# Patient Record
Sex: FEMALE | Race: WHITE | NOT HISPANIC OR LATINO | URBAN - METROPOLITAN AREA
[De-identification: names, ages, dates, MRNs, and addresses within clinical notes are randomized per-mention and may not be internally consistent; named-entity substitution may affect disease eponyms.]

---

## 2020-08-17 ENCOUNTER — EMERGENCY (EMERGENCY)
Facility: HOSPITAL | Age: 20
LOS: 1 days | End: 2020-08-17
Attending: EMERGENCY MEDICINE | Admitting: EMERGENCY MEDICINE
Payer: COMMERCIAL

## 2020-08-17 VITALS
OXYGEN SATURATION: 98 % | RESPIRATION RATE: 20 BRPM | HEIGHT: 67 IN | SYSTOLIC BLOOD PRESSURE: 138 MMHG | WEIGHT: 130.07 LBS | DIASTOLIC BLOOD PRESSURE: 79 MMHG | HEART RATE: 120 BPM | TEMPERATURE: 98 F

## 2020-08-17 LAB
ALBUMIN SERPL ELPH-MCNC: 5 G/DL — SIGNIFICANT CHANGE UP (ref 3.3–5)
ALP SERPL-CCNC: 62 U/L — SIGNIFICANT CHANGE UP (ref 30–120)
ALT FLD-CCNC: 16 U/L DA — SIGNIFICANT CHANGE UP (ref 10–60)
ANION GAP SERPL CALC-SCNC: 12 MMOL/L — SIGNIFICANT CHANGE UP (ref 5–17)
APAP SERPL-MCNC: <1 UG/ML — LOW (ref 10–30)
APPEARANCE UR: CLEAR — SIGNIFICANT CHANGE UP
AST SERPL-CCNC: 22 U/L — SIGNIFICANT CHANGE UP (ref 10–40)
BASOPHILS # BLD AUTO: 0.06 K/UL — SIGNIFICANT CHANGE UP (ref 0–0.2)
BASOPHILS NFR BLD AUTO: 0.7 % — SIGNIFICANT CHANGE UP (ref 0–2)
BILIRUB SERPL-MCNC: 2.2 MG/DL — HIGH (ref 0.2–1.2)
BILIRUB UR-MCNC: NEGATIVE — SIGNIFICANT CHANGE UP
BUN SERPL-MCNC: 12 MG/DL — SIGNIFICANT CHANGE UP (ref 7–23)
CALCIUM SERPL-MCNC: 9.4 MG/DL — SIGNIFICANT CHANGE UP (ref 8.4–10.5)
CHLORIDE SERPL-SCNC: 104 MMOL/L — SIGNIFICANT CHANGE UP (ref 96–108)
CO2 SERPL-SCNC: 26 MMOL/L — SIGNIFICANT CHANGE UP (ref 22–31)
COLOR SPEC: YELLOW — SIGNIFICANT CHANGE UP
CREAT SERPL-MCNC: 0.98 MG/DL — SIGNIFICANT CHANGE UP (ref 0.5–1.3)
DIFF PNL FLD: NEGATIVE — SIGNIFICANT CHANGE UP
EOSINOPHIL # BLD AUTO: 0.05 K/UL — SIGNIFICANT CHANGE UP (ref 0–0.5)
EOSINOPHIL NFR BLD AUTO: 0.6 % — SIGNIFICANT CHANGE UP (ref 0–6)
ETHANOL SERPL-MCNC: <3 MG/DL — SIGNIFICANT CHANGE UP (ref 0–3)
GLUCOSE SERPL-MCNC: 115 MG/DL — HIGH (ref 70–99)
GLUCOSE UR QL: NEGATIVE MG/DL — SIGNIFICANT CHANGE UP
HCG UR QL: NEGATIVE — SIGNIFICANT CHANGE UP
HCT VFR BLD CALC: 36.5 % — SIGNIFICANT CHANGE UP (ref 34.5–45)
HGB BLD-MCNC: 12.3 G/DL — SIGNIFICANT CHANGE UP (ref 11.5–15.5)
IMM GRANULOCYTES NFR BLD AUTO: 0.2 % — SIGNIFICANT CHANGE UP (ref 0–1.5)
KETONES UR-MCNC: ABNORMAL
LEUKOCYTE ESTERASE UR-ACNC: NEGATIVE — SIGNIFICANT CHANGE UP
LYMPHOCYTES # BLD AUTO: 1.72 K/UL — SIGNIFICANT CHANGE UP (ref 1–3.3)
LYMPHOCYTES # BLD AUTO: 20.4 % — SIGNIFICANT CHANGE UP (ref 13–44)
MCHC RBC-ENTMCNC: 31.1 PG — SIGNIFICANT CHANGE UP (ref 27–34)
MCHC RBC-ENTMCNC: 33.7 GM/DL — SIGNIFICANT CHANGE UP (ref 32–36)
MCV RBC AUTO: 92.2 FL — SIGNIFICANT CHANGE UP (ref 80–100)
MONOCYTES # BLD AUTO: 0.8 K/UL — SIGNIFICANT CHANGE UP (ref 0–0.9)
MONOCYTES NFR BLD AUTO: 9.5 % — SIGNIFICANT CHANGE UP (ref 2–14)
NEUTROPHILS # BLD AUTO: 5.8 K/UL — SIGNIFICANT CHANGE UP (ref 1.8–7.4)
NEUTROPHILS NFR BLD AUTO: 68.6 % — SIGNIFICANT CHANGE UP (ref 43–77)
NITRITE UR-MCNC: NEGATIVE — SIGNIFICANT CHANGE UP
NRBC # BLD: 0 /100 WBCS — SIGNIFICANT CHANGE UP (ref 0–0)
PCP SPEC-MCNC: SIGNIFICANT CHANGE UP
PH UR: 5 — SIGNIFICANT CHANGE UP (ref 5–8)
PLATELET # BLD AUTO: 290 K/UL — SIGNIFICANT CHANGE UP (ref 150–400)
POTASSIUM SERPL-MCNC: 2.9 MMOL/L — CRITICAL LOW (ref 3.5–5.3)
POTASSIUM SERPL-SCNC: 2.9 MMOL/L — CRITICAL LOW (ref 3.5–5.3)
PROT SERPL-MCNC: 8.6 G/DL — HIGH (ref 6–8.3)
PROT UR-MCNC: NEGATIVE MG/DL — SIGNIFICANT CHANGE UP
RBC # BLD: 3.96 M/UL — SIGNIFICANT CHANGE UP (ref 3.8–5.2)
RBC # FLD: 12.9 % — SIGNIFICANT CHANGE UP (ref 10.3–14.5)
SALICYLATES SERPL-MCNC: 0.8 MG/DL — LOW (ref 2.8–20)
SODIUM SERPL-SCNC: 142 MMOL/L — SIGNIFICANT CHANGE UP (ref 135–145)
SP GR SPEC: 1 — LOW (ref 1.01–1.02)
UROBILINOGEN FLD QL: NEGATIVE MG/DL — SIGNIFICANT CHANGE UP
WBC # BLD: 8.45 K/UL — SIGNIFICANT CHANGE UP (ref 3.8–10.5)
WBC # FLD AUTO: 8.45 K/UL — SIGNIFICANT CHANGE UP (ref 3.8–10.5)

## 2020-08-17 PROCEDURE — 93010 ELECTROCARDIOGRAM REPORT: CPT

## 2020-08-17 PROCEDURE — 99291 CRITICAL CARE FIRST HOUR: CPT

## 2020-08-17 RX ORDER — POTASSIUM CHLORIDE 20 MEQ
10 PACKET (EA) ORAL ONCE
Refills: 0 | Status: COMPLETED | OUTPATIENT
Start: 2020-08-17 | End: 2020-08-17

## 2020-08-17 RX ORDER — SODIUM CHLORIDE 9 MG/ML
1000 INJECTION INTRAMUSCULAR; INTRAVENOUS; SUBCUTANEOUS ONCE
Refills: 0 | Status: COMPLETED | OUTPATIENT
Start: 2020-08-17 | End: 2020-08-17

## 2020-08-17 RX ORDER — ONDANSETRON 8 MG/1
4 TABLET, FILM COATED ORAL ONCE
Refills: 0 | Status: COMPLETED | OUTPATIENT
Start: 2020-08-17 | End: 2020-08-17

## 2020-08-17 RX ADMIN — SODIUM CHLORIDE 1000 MILLILITER(S): 9 INJECTION INTRAMUSCULAR; INTRAVENOUS; SUBCUTANEOUS at 23:04

## 2020-08-17 RX ADMIN — SODIUM CHLORIDE 1000 MILLILITER(S): 9 INJECTION INTRAMUSCULAR; INTRAVENOUS; SUBCUTANEOUS at 23:38

## 2020-08-17 RX ADMIN — ONDANSETRON 4 MILLIGRAM(S): 8 TABLET, FILM COATED ORAL at 23:04

## 2020-08-17 RX ADMIN — Medication 100 MILLIEQUIVALENT(S): at 23:09

## 2020-08-17 RX ADMIN — SODIUM CHLORIDE 1000 MILLILITER(S): 9 INJECTION INTRAMUSCULAR; INTRAVENOUS; SUBCUTANEOUS at 21:40

## 2020-08-17 RX ADMIN — ONDANSETRON 4 MILLIGRAM(S): 8 TABLET, FILM COATED ORAL at 22:23

## 2020-08-17 NOTE — ED ADULT NURSE NOTE - NS TRANSFER PATIENT BELONGINGS
Cell Phone/PDA (specify)/Jewelry/Money (specify)/clothing secrued at nurse's desk, valuables given to security/Clothing

## 2020-08-17 NOTE — ED ADULT NURSE NOTE - HPI (INCLUDE ILLNESS QUALITY, SEVERITY, DURATION, TIMING, CONTEXT, MODIFYING FACTORS, ASSOCIATED SIGNS AND SYMPTOMS)
Patient took overdose of Naproxen 250mg multiple tablets in an attempt to harm herself and then thought about it and told her parents. Patient unsure of stressors but states her laptop broke yesterday and her father earlier today was telling her what it would require in order to be fixed.

## 2020-08-17 NOTE — ED PROVIDER NOTE - CLINICAL SUMMARY MEDICAL DECISION MAKING FREE TEXT BOX
19 y.o. F BIB parents for suicidal overdose naproxen about 1.5 hr ago - had one episode of vomiting - in ED appears well, still nauseated - will check labs for psych clearance, provide antiemetic, hydrate, reassess

## 2020-08-17 NOTE — ED ADULT NURSE NOTE - OBJECTIVE STATEMENT
Patient states about 1 1/2 hours ago she took "handfulls" of Naproxen in an attempt to harm herself Patient states about 1 1/2 hours ago she took "handfulls" of Naproxen in an attempt to harm herself. Patient states it was a bottle of about 100 tabs, 250mg each and thinks she may have taken about 70 tabs. Patient states she told a friend and then decided to tell her parents what she had done. Patient has history of prior overdose of Advil 3 years ago and states she was seen in an ED, spoke with psych and was cleared to go home. Patient has never been admitted to psych. Patient also states she does cutting at times, few linear scars noted to her left forearm/wrist. Patient states about 1 1/2 hours ago she took "handfulls" of Naproxen in an attempt to harm herself. Patient states it was a bottle of about 100 tabs, 250mg each and thinks she may have taken about 70 tabs. Patient states she told a friend and then decided to tell her parents what she had done. Patient self induced vomiting once prior to arrival and continues to feel nausea and dizziness. Patient is awake alert and cooperative. Patient has history of prior overdose of Advil 3 years ago and states she was seen in an ED, spoke with psych and was cleared to go home. Patient has never been admitted to psych. Patient also states she does cutting at times, few linear scars noted to her left forearm/wrist.

## 2020-08-17 NOTE — ED PROVIDER NOTE - OBJECTIVE STATEMENT
19 y.o. F c/o suicidal ingestion, states she finished a bottle of naproxen 250mg tabs - states was a 100tab bottle, was not full, just took them by the handful until it was empty - states she vomited a lot in the car on the way here, still feeling some nausea and lightheadedness, no other complaints.     States she was upset b/c her laptop was broken and her father was telling her about how expensive it would be to repair and her mother and brother had said something else that upset her,    tried to overdose once before - on motrin when she was 16, states she was seen at a hospital, had psych eval and was released.  has some cutting behavior as well 19 y.o. F c/o suicidal ingestion, states she finished a bottle of naproxen 250mg tabs - states was a 100tab bottle, was not full, just took them by the handful until it was empty - states she vomited a lot in the car on the way here, still feeling some nausea and lightheadedness, no other complaints. No report of coingestants     States she was upset b/c her laptop was broken and her father was telling her about how expensive it would be to repair and her mother and brother had said something else that upset her,    tried to overdose once before - on motrin when she was 16, states she was seen at a hospital, had psych eval and was released.  has some cutting behavior as well previously - states she was on meds but tried a lot of different ones and none helped, also did not get along with therapist, so has no current treatment for depression electronic

## 2020-08-17 NOTE — ED ADULT NURSE NOTE - ED STAT RN HANDOFF DETAILS
Constant observation maintained. IV patent and infusing. Patient received Zofran X 2 with relief of vomiting at this time. CM sinus tachycardia. Patient awake, alert and cooperative. Constant observation maintained. IV patent and infusing. Patient received Zofran X 2 with relief of vomiting at this time. CM sinus tachycardia. Patient awake, alert and cooperative. Endorsed to Desmond Little RN

## 2020-08-17 NOTE — ED PROVIDER NOTE - NOTES
discussed case with [facility] toxicology fellow - pt in no distress at this time, reports feeling lightheaded and didn't eat much today - will provide supportive care, monitor for change in status, clear if asymptomatic at 6hr for psych - will rediscuss case if any significant changes discussed case with [facility] toxicology fellow Mihaela - pt in no distress at this time, reports feeling lightheaded and didn't eat much today - will provide supportive care, monitor for change in status, clear if asymptomatic at 6hr for psych - will rediscuss case if any significant changes

## 2020-08-17 NOTE — ED PROVIDER NOTE - PROGRESS NOTE DETAILS
pt vomiting, bilious/whitish, no distinct pills in emesis still n/v now, may require medical admission for supportive care with psych c/s went to check on pt after reglan, nurse states she isn't talking to us now, pt is awake, more sleepy, moving arms/legs a lot, turning head left/right, answered 1 question that is still nauseated - pt was previously cooperative/alert/very responsive - aide on 1:1 thinks this change occurred around the time the reglan was administered - akathisia from reglan vs AMS from nsaid OD - rediscussed with med tox fellow - will recheck labs, getting benedryl, needs medical admission - will transfer to [facility] if accepted seems able to focus a little more after dose of benedryl but still fidgety and still less talkative than earlier pt accepted for transfer to [facility] ED, will be assessed for level of admission on assessment there, MICU attg aware of transfer pt not fidgeting now, when name is called appears more alert, then closes eyes again, appears to understand she is being transferred now to [facility], EMS is here

## 2020-08-18 ENCOUNTER — INPATIENT (INPATIENT)
Facility: HOSPITAL | Age: 20
LOS: 1 days | Discharge: PSYCHIATRIC OR SUBSTANCE USE FACILITY | DRG: 917 | End: 2020-08-20
Attending: STUDENT IN AN ORGANIZED HEALTH CARE EDUCATION/TRAINING PROGRAM | Admitting: STUDENT IN AN ORGANIZED HEALTH CARE EDUCATION/TRAINING PROGRAM
Payer: COMMERCIAL

## 2020-08-18 VITALS
RESPIRATION RATE: 20 BRPM | DIASTOLIC BLOOD PRESSURE: 53 MMHG | TEMPERATURE: 98 F | HEART RATE: 113 BPM | OXYGEN SATURATION: 99 % | SYSTOLIC BLOOD PRESSURE: 105 MMHG

## 2020-08-18 VITALS
HEIGHT: 67 IN | SYSTOLIC BLOOD PRESSURE: 148 MMHG | RESPIRATION RATE: 21 BRPM | DIASTOLIC BLOOD PRESSURE: 74 MMHG | HEART RATE: 113 BPM | OXYGEN SATURATION: 100 %

## 2020-08-18 DIAGNOSIS — T39.392A POISONING BY OTHER NONSTEROIDAL ANTI-INFLAMMATORY DRUGS [NSAID], INTENTIONAL SELF-HARM, INITIAL ENCOUNTER: ICD-10-CM

## 2020-08-18 LAB
ALBUMIN SERPL ELPH-MCNC: 4.4 G/DL — SIGNIFICANT CHANGE UP (ref 3.3–5)
ALBUMIN SERPL ELPH-MCNC: 4.5 G/DL — SIGNIFICANT CHANGE UP (ref 3.3–5)
ALP SERPL-CCNC: 52 U/L — SIGNIFICANT CHANGE UP (ref 40–120)
ALP SERPL-CCNC: 54 U/L — SIGNIFICANT CHANGE UP (ref 30–120)
ALT FLD-CCNC: 12 U/L DA — SIGNIFICANT CHANGE UP (ref 10–60)
ALT FLD-CCNC: 14 U/L — SIGNIFICANT CHANGE UP (ref 10–45)
ANION GAP SERPL CALC-SCNC: 12 MMOL/L — SIGNIFICANT CHANGE UP (ref 5–17)
ANION GAP SERPL CALC-SCNC: 15 MMOL/L — SIGNIFICANT CHANGE UP (ref 5–17)
ANION GAP SERPL CALC-SCNC: 16 MMOL/L — SIGNIFICANT CHANGE UP (ref 5–17)
ANION GAP SERPL CALC-SCNC: 17 MMOL/L — SIGNIFICANT CHANGE UP (ref 5–17)
ANION GAP SERPL CALC-SCNC: 21 MMOL/L — HIGH (ref 5–17)
APAP SERPL-MCNC: <15 UG/ML — SIGNIFICANT CHANGE UP (ref 10–30)
APPEARANCE UR: CLEAR — SIGNIFICANT CHANGE UP
APPEARANCE UR: CLEAR — SIGNIFICANT CHANGE UP
AST SERPL-CCNC: 24 U/L — SIGNIFICANT CHANGE UP (ref 10–40)
AST SERPL-CCNC: 25 U/L — SIGNIFICANT CHANGE UP (ref 10–40)
BACTERIA # UR AUTO: NEGATIVE — SIGNIFICANT CHANGE UP
BASE EXCESS BLDCOV CALC-SCNC: -8.6 MMOL/L — SIGNIFICANT CHANGE UP (ref -9.3–0.3)
BASE EXCESS BLDV CALC-SCNC: -2.5 MMOL/L — LOW (ref -2–2)
BASE EXCESS BLDV CALC-SCNC: -2.8 MMOL/L — LOW (ref -2–2)
BASE EXCESS BLDV CALC-SCNC: -3.5 MMOL/L — LOW (ref -2–2)
BASE EXCESS BLDV CALC-SCNC: -3.9 MMOL/L — LOW (ref -2–2)
BASOPHILS # BLD AUTO: 0 K/UL — SIGNIFICANT CHANGE UP (ref 0–0.2)
BASOPHILS NFR BLD AUTO: 0 % — SIGNIFICANT CHANGE UP (ref 0–2)
BILIRUB SERPL-MCNC: 3.6 MG/DL — HIGH (ref 0.2–1.2)
BILIRUB SERPL-MCNC: <0.1 MG/DL — LOW (ref 0.2–1.2)
BILIRUB UR-MCNC: NEGATIVE — SIGNIFICANT CHANGE UP
BILIRUB UR-MCNC: NEGATIVE — SIGNIFICANT CHANGE UP
BUN SERPL-MCNC: 6 MG/DL — LOW (ref 7–23)
BUN SERPL-MCNC: 6 MG/DL — LOW (ref 7–23)
BUN SERPL-MCNC: 7 MG/DL — SIGNIFICANT CHANGE UP (ref 7–23)
BUN SERPL-MCNC: 7 MG/DL — SIGNIFICANT CHANGE UP (ref 7–23)
BUN SERPL-MCNC: 9 MG/DL — SIGNIFICANT CHANGE UP (ref 7–23)
CA-I SERPL-SCNC: 1.14 MMOL/L — SIGNIFICANT CHANGE UP (ref 1.12–1.3)
CA-I SERPL-SCNC: 1.16 MMOL/L — SIGNIFICANT CHANGE UP (ref 1.12–1.3)
CA-I SERPL-SCNC: 1.17 MMOL/L — SIGNIFICANT CHANGE UP (ref 1.12–1.3)
CA-I SERPL-SCNC: 1.18 MMOL/L — SIGNIFICANT CHANGE UP (ref 1.12–1.3)
CALCIUM SERPL-MCNC: 8.1 MG/DL — LOW (ref 8.4–10.5)
CALCIUM SERPL-MCNC: 8.1 MG/DL — LOW (ref 8.4–10.5)
CALCIUM SERPL-MCNC: 8.5 MG/DL — SIGNIFICANT CHANGE UP (ref 8.4–10.5)
CALCIUM SERPL-MCNC: 8.8 MG/DL — SIGNIFICANT CHANGE UP (ref 8.4–10.5)
CALCIUM SERPL-MCNC: 9 MG/DL — SIGNIFICANT CHANGE UP (ref 8.4–10.5)
CHLORIDE BLDV-SCNC: 109 MMOL/L — HIGH (ref 96–108)
CHLORIDE BLDV-SCNC: 114 MMOL/L — HIGH (ref 96–108)
CHLORIDE SERPL-SCNC: 102 MMOL/L — SIGNIFICANT CHANGE UP (ref 96–108)
CHLORIDE SERPL-SCNC: 103 MMOL/L — SIGNIFICANT CHANGE UP (ref 96–108)
CHLORIDE SERPL-SCNC: 103 MMOL/L — SIGNIFICANT CHANGE UP (ref 96–108)
CHLORIDE SERPL-SCNC: 106 MMOL/L — SIGNIFICANT CHANGE UP (ref 96–108)
CHLORIDE SERPL-SCNC: 107 MMOL/L — SIGNIFICANT CHANGE UP (ref 96–108)
CO2 BLDCOV-SCNC: 26 MMOL/L — SIGNIFICANT CHANGE UP (ref 22–30)
CO2 BLDV-SCNC: 20 MMOL/L — LOW (ref 22–30)
CO2 BLDV-SCNC: 20 MMOL/L — LOW (ref 22–30)
CO2 BLDV-SCNC: 21 MMOL/L — LOW (ref 22–30)
CO2 BLDV-SCNC: 22 MMOL/L — SIGNIFICANT CHANGE UP (ref 22–30)
CO2 SERPL-SCNC: 16 MMOL/L — LOW (ref 22–31)
CO2 SERPL-SCNC: 17 MMOL/L — LOW (ref 22–31)
CO2 SERPL-SCNC: 18 MMOL/L — LOW (ref 22–31)
CO2 SERPL-SCNC: 19 MMOL/L — LOW (ref 22–31)
CO2 SERPL-SCNC: 21 MMOL/L — LOW (ref 22–31)
COLOR SPEC: COLORLESS — SIGNIFICANT CHANGE UP
COLOR SPEC: SIGNIFICANT CHANGE UP
CREAT SERPL-MCNC: 0.6 MG/DL — SIGNIFICANT CHANGE UP (ref 0.5–1.3)
CREAT SERPL-MCNC: 0.62 MG/DL — SIGNIFICANT CHANGE UP (ref 0.5–1.3)
CREAT SERPL-MCNC: 0.68 MG/DL — SIGNIFICANT CHANGE UP (ref 0.5–1.3)
CREAT SERPL-MCNC: 0.7 MG/DL — SIGNIFICANT CHANGE UP (ref 0.5–1.3)
CREAT SERPL-MCNC: 1.04 MG/DL — SIGNIFICANT CHANGE UP (ref 0.5–1.3)
DIFF PNL FLD: ABNORMAL
DIFF PNL FLD: ABNORMAL
EOSINOPHIL # BLD AUTO: 0 K/UL — SIGNIFICANT CHANGE UP (ref 0–0.5)
EOSINOPHIL NFR BLD AUTO: 0 % — SIGNIFICANT CHANGE UP (ref 0–6)
EPI CELLS # UR: 1 /HPF — SIGNIFICANT CHANGE UP
ETHANOL SERPL-MCNC: SIGNIFICANT CHANGE UP MG/DL (ref 0–10)
GAS PNL BLDCOV: 7.39 — SIGNIFICANT CHANGE UP (ref 7.25–7.45)
GAS PNL BLDV: 135 MMOL/L — SIGNIFICANT CHANGE UP (ref 135–145)
GAS PNL BLDV: 136 MMOL/L — SIGNIFICANT CHANGE UP (ref 135–145)
GAS PNL BLDV: 136 MMOL/L — SIGNIFICANT CHANGE UP (ref 135–145)
GAS PNL BLDV: 137 MMOL/L — SIGNIFICANT CHANGE UP (ref 135–145)
GAS PNL BLDV: SIGNIFICANT CHANGE UP
GLUCOSE BLDV-MCNC: 101 MG/DL — HIGH (ref 70–99)
GLUCOSE BLDV-MCNC: 114 MG/DL — HIGH (ref 70–99)
GLUCOSE BLDV-MCNC: 91 MG/DL — SIGNIFICANT CHANGE UP (ref 70–99)
GLUCOSE BLDV-MCNC: 96 MG/DL — SIGNIFICANT CHANGE UP (ref 70–99)
GLUCOSE SERPL-MCNC: 101 MG/DL — HIGH (ref 70–99)
GLUCOSE SERPL-MCNC: 136 MG/DL — HIGH (ref 70–99)
GLUCOSE SERPL-MCNC: 147 MG/DL — HIGH (ref 70–99)
GLUCOSE SERPL-MCNC: 94 MG/DL — SIGNIFICANT CHANGE UP (ref 70–99)
GLUCOSE SERPL-MCNC: 97 MG/DL — SIGNIFICANT CHANGE UP (ref 70–99)
GLUCOSE UR QL: ABNORMAL
GLUCOSE UR QL: NEGATIVE — SIGNIFICANT CHANGE UP
HCG SERPL-ACNC: <2 MIU/ML — SIGNIFICANT CHANGE UP
HCO3 BLDCOV-SCNC: 17 MMOL/L — SIGNIFICANT CHANGE UP (ref 17–25)
HCO3 BLDV-SCNC: 19 MMOL/L — LOW (ref 21–29)
HCO3 BLDV-SCNC: 19 MMOL/L — LOW (ref 21–29)
HCO3 BLDV-SCNC: 20 MMOL/L — LOW (ref 21–29)
HCO3 BLDV-SCNC: 20 MMOL/L — LOW (ref 21–29)
HCT VFR BLD CALC: 34.3 % — LOW (ref 34.5–45)
HCT VFR BLDA CALC: 33 % — LOW (ref 39–50)
HCT VFR BLDA CALC: 34 % — LOW (ref 39–50)
HCT VFR BLDA CALC: 34 % — LOW (ref 39–50)
HCT VFR BLDA CALC: 36 % — LOW (ref 39–50)
HGB BLD CALC-MCNC: 10.6 G/DL — LOW (ref 11.5–15.5)
HGB BLD CALC-MCNC: 10.9 G/DL — LOW (ref 11.5–15.5)
HGB BLD CALC-MCNC: 11.2 G/DL — LOW (ref 11.5–15.5)
HGB BLD CALC-MCNC: 11.7 G/DL — SIGNIFICANT CHANGE UP (ref 11.5–15.5)
HGB BLD-MCNC: 11.4 G/DL — LOW (ref 11.5–15.5)
HOROWITZ INDEX BLDV+IHG-RTO: 21 — SIGNIFICANT CHANGE UP
HYALINE CASTS # UR AUTO: 0 /LPF — SIGNIFICANT CHANGE UP (ref 0–2)
KETONES UR-MCNC: ABNORMAL
KETONES UR-MCNC: ABNORMAL
LACTATE BLDV-MCNC: 0.5 MMOL/L — LOW (ref 0.7–2)
LACTATE BLDV-MCNC: 0.7 MMOL/L — SIGNIFICANT CHANGE UP (ref 0.7–2)
LACTATE BLDV-MCNC: 0.8 MMOL/L — SIGNIFICANT CHANGE UP (ref 0.7–2)
LACTATE BLDV-MCNC: 0.8 MMOL/L — SIGNIFICANT CHANGE UP (ref 0.7–2)
LEUKOCYTE ESTERASE UR-ACNC: NEGATIVE — SIGNIFICANT CHANGE UP
LEUKOCYTE ESTERASE UR-ACNC: NEGATIVE — SIGNIFICANT CHANGE UP
LYMPHOCYTES # BLD AUTO: 0 % — LOW (ref 13–44)
LYMPHOCYTES # BLD AUTO: 0 K/UL — LOW (ref 1–3.3)
MAGNESIUM SERPL-MCNC: 1.6 MG/DL — SIGNIFICANT CHANGE UP (ref 1.6–2.6)
MAGNESIUM SERPL-MCNC: 1.7 MG/DL — SIGNIFICANT CHANGE UP (ref 1.6–2.6)
MAGNESIUM SERPL-MCNC: 2.3 MG/DL — SIGNIFICANT CHANGE UP (ref 1.6–2.6)
MCHC RBC-ENTMCNC: 30.8 PG — SIGNIFICANT CHANGE UP (ref 27–34)
MCHC RBC-ENTMCNC: 33.2 GM/DL — SIGNIFICANT CHANGE UP (ref 32–36)
MCV RBC AUTO: 92.7 FL — SIGNIFICANT CHANGE UP (ref 80–100)
MONOCYTES # BLD AUTO: 0.68 K/UL — SIGNIFICANT CHANGE UP (ref 0–0.9)
MONOCYTES NFR BLD AUTO: 3.5 % — SIGNIFICANT CHANGE UP (ref 2–14)
NEUTROPHILS # BLD AUTO: 18.75 K/UL — HIGH (ref 1.8–7.4)
NEUTROPHILS NFR BLD AUTO: 92.2 % — HIGH (ref 43–77)
NITRITE UR-MCNC: NEGATIVE — SIGNIFICANT CHANGE UP
NITRITE UR-MCNC: NEGATIVE — SIGNIFICANT CHANGE UP
OTHER CELLS CSF MANUAL: 14 ML/DL — LOW (ref 16–22)
OTHER CELLS CSF MANUAL: 15 ML/DL — LOW (ref 16–22)
OTHER CELLS CSF MANUAL: 15 ML/DL — LOW (ref 16–22)
OTHER CELLS CSF MANUAL: 15 ML/DL — LOW (ref 18–22)
PCO2 BLDCOV: 26 MMHG — LOW (ref 27–49)
PCO2 BLDV: 26 MMHG — LOW (ref 35–50)
PCO2 BLDV: 31 MMHG — LOW (ref 35–50)
PH BLDV: 7.41 — SIGNIFICANT CHANGE UP (ref 7.35–7.45)
PH BLDV: 7.43 — SIGNIFICANT CHANGE UP (ref 7.35–7.45)
PH BLDV: 7.43 — SIGNIFICANT CHANGE UP (ref 7.35–7.45)
PH BLDV: 7.46 — HIGH (ref 7.35–7.45)
PH UR: 6 — SIGNIFICANT CHANGE UP (ref 5–8)
PH UR: 6 — SIGNIFICANT CHANGE UP (ref 5–8)
PHOSPHATE SERPL-MCNC: 1.8 MG/DL — LOW (ref 2.5–4.5)
PHOSPHATE SERPL-MCNC: 1.9 MG/DL — LOW (ref 2.5–4.5)
PHOSPHATE SERPL-MCNC: 2 MG/DL — LOW (ref 2.5–4.5)
PLATELET # BLD AUTO: 280 K/UL — SIGNIFICANT CHANGE UP (ref 150–400)
PO2 BLDCOA: 23 MMHG — SIGNIFICANT CHANGE UP (ref 17–41)
PO2 BLDV: 100 MMHG — HIGH (ref 25–45)
PO2 BLDV: 106 MMHG — HIGH (ref 25–45)
PO2 BLDV: 60 MMHG — HIGH (ref 25–45)
PO2 BLDV: 66 MMHG — HIGH (ref 25–45)
POTASSIUM BLDV-SCNC: 2.9 MMOL/L — CRITICAL LOW (ref 3.5–5.3)
POTASSIUM BLDV-SCNC: 3.1 MMOL/L — LOW (ref 3.5–5.3)
POTASSIUM BLDV-SCNC: 3.5 MMOL/L — SIGNIFICANT CHANGE UP (ref 3.5–5.3)
POTASSIUM BLDV-SCNC: 3.9 MMOL/L — SIGNIFICANT CHANGE UP (ref 3.5–5.3)
POTASSIUM SERPL-MCNC: 3 MMOL/L — LOW (ref 3.5–5.3)
POTASSIUM SERPL-MCNC: 3.1 MMOL/L — LOW (ref 3.5–5.3)
POTASSIUM SERPL-MCNC: 3.6 MMOL/L — SIGNIFICANT CHANGE UP (ref 3.5–5.3)
POTASSIUM SERPL-MCNC: 3.9 MMOL/L — SIGNIFICANT CHANGE UP (ref 3.5–5.3)
POTASSIUM SERPL-MCNC: 4.1 MMOL/L — SIGNIFICANT CHANGE UP (ref 3.5–5.3)
POTASSIUM SERPL-SCNC: 3 MMOL/L — LOW (ref 3.5–5.3)
POTASSIUM SERPL-SCNC: 3.1 MMOL/L — LOW (ref 3.5–5.3)
POTASSIUM SERPL-SCNC: 3.6 MMOL/L — SIGNIFICANT CHANGE UP (ref 3.5–5.3)
POTASSIUM SERPL-SCNC: 3.9 MMOL/L — SIGNIFICANT CHANGE UP (ref 3.5–5.3)
POTASSIUM SERPL-SCNC: 4.1 MMOL/L — SIGNIFICANT CHANGE UP (ref 3.5–5.3)
PROT SERPL-MCNC: 7.3 G/DL — SIGNIFICANT CHANGE UP (ref 6–8.3)
PROT SERPL-MCNC: 7.8 G/DL — SIGNIFICANT CHANGE UP (ref 6–8.3)
PROT UR-MCNC: NEGATIVE — SIGNIFICANT CHANGE UP
PROT UR-MCNC: NEGATIVE — SIGNIFICANT CHANGE UP
RBC # BLD: 3.7 M/UL — LOW (ref 3.8–5.2)
RBC # FLD: 13.2 % — SIGNIFICANT CHANGE UP (ref 10.3–14.5)
RBC CASTS # UR COMP ASSIST: 1 /HPF — SIGNIFICANT CHANGE UP (ref 0–4)
SALICYLATES SERPL-MCNC: <2 MG/DL — LOW (ref 15–30)
SAO2 % BLDCOV: 34 % — SIGNIFICANT CHANGE UP (ref 20–75)
SAO2 % BLDV: 90 % — HIGH (ref 67–88)
SAO2 % BLDV: 94 % — HIGH (ref 67–88)
SAO2 % BLDV: 98 % — HIGH (ref 67–88)
SAO2 % BLDV: 98 % — HIGH (ref 67–88)
SARS-COV-2 IGG SERPL QL IA: NEGATIVE — SIGNIFICANT CHANGE UP
SARS-COV-2 IGG SERPL QL IA: NEGATIVE — SIGNIFICANT CHANGE UP
SARS-COV-2 IGM SERPL IA-ACNC: 6.13 AU/ML — SIGNIFICANT CHANGE UP
SARS-COV-2 IGM SERPL IA-ACNC: <0.1 INDEX — SIGNIFICANT CHANGE UP
SARS-COV-2 RNA SPEC QL NAA+PROBE: SIGNIFICANT CHANGE UP
SARS-COV-2 RNA SPEC QL NAA+PROBE: SIGNIFICANT CHANGE UP
SODIUM SERPL-SCNC: 135 MMOL/L — SIGNIFICANT CHANGE UP (ref 135–145)
SODIUM SERPL-SCNC: 138 MMOL/L — SIGNIFICANT CHANGE UP (ref 135–145)
SODIUM SERPL-SCNC: 138 MMOL/L — SIGNIFICANT CHANGE UP (ref 135–145)
SODIUM SERPL-SCNC: 140 MMOL/L — SIGNIFICANT CHANGE UP (ref 135–145)
SODIUM SERPL-SCNC: 142 MMOL/L — SIGNIFICANT CHANGE UP (ref 135–145)
SP GR SPEC: 1.01 — SIGNIFICANT CHANGE UP (ref 1.01–1.02)
SP GR SPEC: 1.02 — SIGNIFICANT CHANGE UP (ref 1.01–1.02)
TSH SERPL-MCNC: 0.82 UIU/ML — SIGNIFICANT CHANGE UP (ref 0.27–4.2)
UROBILINOGEN FLD QL: NEGATIVE — SIGNIFICANT CHANGE UP
UROBILINOGEN FLD QL: NEGATIVE — SIGNIFICANT CHANGE UP
WBC # BLD: 19.43 K/UL — HIGH (ref 3.8–10.5)
WBC # FLD AUTO: 19.43 K/UL — HIGH (ref 3.8–10.5)
WBC UR QL: 2 /HPF — SIGNIFICANT CHANGE UP (ref 0–5)

## 2020-08-18 PROCEDURE — 90792 PSYCH DIAG EVAL W/MED SRVCS: CPT

## 2020-08-18 PROCEDURE — 99291 CRITICAL CARE FIRST HOUR: CPT

## 2020-08-18 PROCEDURE — 70450 CT HEAD/BRAIN W/O DYE: CPT | Mod: 26

## 2020-08-18 PROCEDURE — 93010 ELECTROCARDIOGRAM REPORT: CPT

## 2020-08-18 PROCEDURE — 99291 CRITICAL CARE FIRST HOUR: CPT | Mod: 25

## 2020-08-18 PROCEDURE — 99291 CRITICAL CARE FIRST HOUR: CPT | Mod: CR

## 2020-08-18 RX ORDER — METOCLOPRAMIDE HCL 10 MG
10 TABLET ORAL ONCE
Refills: 0 | Status: COMPLETED | OUTPATIENT
Start: 2020-08-18 | End: 2020-08-18

## 2020-08-18 RX ORDER — MAGNESIUM SULFATE 500 MG/ML
2 VIAL (ML) INJECTION ONCE
Refills: 0 | Status: COMPLETED | OUTPATIENT
Start: 2020-08-18 | End: 2020-08-18

## 2020-08-18 RX ORDER — SODIUM CHLORIDE 9 MG/ML
1000 INJECTION INTRAMUSCULAR; INTRAVENOUS; SUBCUTANEOUS ONCE
Refills: 0 | Status: COMPLETED | OUTPATIENT
Start: 2020-08-18 | End: 2020-08-18

## 2020-08-18 RX ORDER — POTASSIUM CHLORIDE 20 MEQ
40 PACKET (EA) ORAL ONCE
Refills: 0 | Status: COMPLETED | OUTPATIENT
Start: 2020-08-18 | End: 2020-08-18

## 2020-08-18 RX ORDER — POTASSIUM CHLORIDE 20 MEQ
10 PACKET (EA) ORAL
Refills: 0 | Status: COMPLETED | OUTPATIENT
Start: 2020-08-18 | End: 2020-08-18

## 2020-08-18 RX ORDER — DIPHENHYDRAMINE HCL 50 MG
25 CAPSULE ORAL ONCE
Refills: 0 | Status: COMPLETED | OUTPATIENT
Start: 2020-08-18 | End: 2020-08-18

## 2020-08-18 RX ORDER — PIPERACILLIN AND TAZOBACTAM 4; .5 G/20ML; G/20ML
3.38 INJECTION, POWDER, LYOPHILIZED, FOR SOLUTION INTRAVENOUS EVERY 8 HOURS
Refills: 0 | Status: DISCONTINUED | OUTPATIENT
Start: 2020-08-18 | End: 2020-08-18

## 2020-08-18 RX ORDER — SODIUM CHLORIDE 9 MG/ML
1000 INJECTION, SOLUTION INTRAVENOUS
Refills: 0 | Status: DISCONTINUED | OUTPATIENT
Start: 2020-08-18 | End: 2020-08-20

## 2020-08-18 RX ORDER — CHLORHEXIDINE GLUCONATE 213 G/1000ML
1 SOLUTION TOPICAL
Refills: 0 | Status: DISCONTINUED | OUTPATIENT
Start: 2020-08-18 | End: 2020-08-18

## 2020-08-18 RX ORDER — SODIUM CHLORIDE 9 MG/ML
1000 INJECTION, SOLUTION INTRAVENOUS ONCE
Refills: 0 | Status: DISCONTINUED | OUTPATIENT
Start: 2020-08-18 | End: 2020-08-18

## 2020-08-18 RX ORDER — PIPERACILLIN AND TAZOBACTAM 4; .5 G/20ML; G/20ML
3.38 INJECTION, POWDER, LYOPHILIZED, FOR SOLUTION INTRAVENOUS ONCE
Refills: 0 | Status: DISCONTINUED | OUTPATIENT
Start: 2020-08-18 | End: 2020-08-18

## 2020-08-18 RX ORDER — ENOXAPARIN SODIUM 100 MG/ML
40 INJECTION SUBCUTANEOUS DAILY
Refills: 0 | Status: DISCONTINUED | OUTPATIENT
Start: 2020-08-18 | End: 2020-08-20

## 2020-08-18 RX ADMIN — Medication 2 GRAM(S): at 04:12

## 2020-08-18 RX ADMIN — SODIUM CHLORIDE 1000 MILLILITER(S): 9 INJECTION INTRAMUSCULAR; INTRAVENOUS; SUBCUTANEOUS at 01:10

## 2020-08-18 RX ADMIN — Medication 10 MILLIEQUIVALENT(S): at 00:10

## 2020-08-18 RX ADMIN — Medication 100 MILLIEQUIVALENT(S): at 18:47

## 2020-08-18 RX ADMIN — CHLORHEXIDINE GLUCONATE 1 APPLICATION(S): 213 SOLUTION TOPICAL at 06:10

## 2020-08-18 RX ADMIN — ENOXAPARIN SODIUM 40 MILLIGRAM(S): 100 INJECTION SUBCUTANEOUS at 09:45

## 2020-08-18 RX ADMIN — Medication 100 MILLIEQUIVALENT(S): at 00:14

## 2020-08-18 RX ADMIN — Medication 25 MILLIGRAM(S): at 00:55

## 2020-08-18 RX ADMIN — Medication 100 MILLIEQUIVALENT(S): at 16:24

## 2020-08-18 RX ADMIN — Medication 100 GRAM(S): at 02:52

## 2020-08-18 RX ADMIN — Medication 10 MILLIEQUIVALENT(S): at 05:40

## 2020-08-18 RX ADMIN — Medication 100 MILLIEQUIVALENT(S): at 05:40

## 2020-08-18 RX ADMIN — Medication 10 MILLIGRAM(S): at 00:27

## 2020-08-18 RX ADMIN — Medication 40 MILLIEQUIVALENT(S): at 16:24

## 2020-08-18 RX ADMIN — Medication 100 MILLIEQUIVALENT(S): at 17:35

## 2020-08-18 RX ADMIN — Medication 1 MILLIGRAM(S): at 02:42

## 2020-08-18 RX ADMIN — Medication 100 MILLIEQUIVALENT(S): at 04:11

## 2020-08-18 RX ADMIN — SODIUM CHLORIDE 75 MILLILITER(S): 9 INJECTION, SOLUTION INTRAVENOUS at 09:45

## 2020-08-18 NOTE — ED ADULT NURSE REASSESSMENT NOTE - NS ED NURSE REASSESS COMMENT FT1
Report received from DANIEL Parks and Neelima. Patient is not answering questions, or opening eyes to touch. Patient pupils sluggish and 1mm bilaterally. Medication hung as per MD orders, patient tolerating well. No redness, drainage or swelling at IV site, vitals stable. Patient on a 1:1 with staff at bedside, bed locked and in lowest position for safety with side rails up. MICU at bedside to assess patient. Patient sleeping at this time.

## 2020-08-18 NOTE — BEHAVIORAL HEALTH ASSESSMENT NOTE - NSBHCHARTREVIEWVS_PSY_A_CORE FT
ICU Vital Signs Last 24 Hrs  T(C): 36.6 (18 Aug 2020 04:54), Max: 36.7 (17 Aug 2020 21:04)  T(F): 97.9 (18 Aug 2020 04:54), Max: 98 (17 Aug 2020 21:04)  HR: 96 (18 Aug 2020 14:00) (77 - 126)  BP: 113/71 (18 Aug 2020 14:00) (91/54 - 148/74)  BP(mean): 86 (18 Aug 2020 14:00) (66 - 100)  ABP: --  ABP(mean): --  RR: 18 (18 Aug 2020 14:00) (12 - 35)  SpO2: 99% (18 Aug 2020 14:00) (97% - 100%)

## 2020-08-18 NOTE — BEHAVIORAL HEALTH ASSESSMENT NOTE - SUICIDE RISK FACTORS
Cluster B Personality disorders or traits current/past/Access to lethal methods (pills, firearm, etc.: Ask specifically about presence or absence of a firearm in the home or ease of accessing/Hopelessness or despair

## 2020-08-18 NOTE — CHART NOTE - NSCHARTNOTEFT_GEN_A_CORE
MICU Transfer Note  ---------------------------    Transfer from: MICU  Transfer to:  (  ) Medicine    (  ) Telemetry    (  ) RCU    (  ) Palliative    (  ) Stroke Unit    (  ) _______________  Accepting Physician:      CRISTOBALU COURSE        OBJECTIVE --  Vital Signs Last 24 Hrs  T(C): 36.6 (18 Aug 2020 17:00), Max: 36.8 (18 Aug 2020 12:00)  T(F): 97.8 (18 Aug 2020 17:00), Max: 98.2 (18 Aug 2020 12:00)  HR: 68 (18 Aug 2020 19:00) (66 - 126)  BP: 111/56 (18 Aug 2020 19:00) (91/54 - 148/74)  BP(mean): 78 (18 Aug 2020 19:00) (66 - 100)  RR: 21 (18 Aug 2020 19:00) (12 - 35)  SpO2: 97% (18 Aug 2020 19:00) (97% - 100%)    I&O's Summary    17 Aug 2020 07:01  -  18 Aug 2020 07:00  --------------------------------------------------------  IN: 100 mL / OUT: 500 mL / NET: -400 mL    18 Aug 2020 07:01  -  18 Aug 2020 19:37  --------------------------------------------------------  IN: 1215 mL / OUT: 200 mL / NET: 1015 mL        MEDICATIONS  (STANDING):  chlorhexidine 4% Liquid 1 Application(s) Topical <User Schedule>  dextrose 5% + lactated ringers. 1000 milliLiter(s) (75 mL/Hr) IV Continuous <Continuous>  enoxaparin Injectable 40 milliGRAM(s) SubCutaneous daily    MEDICATIONS  (PRN):        LABS                                            11.4                  Neurophils% (auto):   92.2   (08-18 @ 02:30):    19.43)-----------(280          Lymphocytes% (auto):  0.0                                           34.3                   Eosinphils% (auto):   0.0      Manual%: Neutrophils x    ; Lymphocytes x    ; Eosinophils x    ; Bands%: 4.3  ; Blasts x                                    138    |  103    |  7                   Calcium: 8.8   / iCa: x      (08-18 @ 15:32)    ----------------------------<  97        Magnesium: x                                3.0     |  18     |  0.70             Phosphorous: x        TPro  7.3    /  Alb  4.4    /  TBili  <0.1   /  DBili  x      /  AST  25     /  ALT  14     /  AlkPhos  52     18 Aug 2020 02:30            ASSESSMENT & PLAN:         For Follow-Up:  [ ]   [ ] MICU Transfer Note  ---------------------------    Transfer from: MICU  Transfer to:  (  ) Medicine    (  ) Telemetry    (  ) RCU    (  ) Palliative    (  ) Stroke Unit    (  ) _______________  Accepting Physician:      MICU COURSE  18yo F with hx of drug overdose and depression presents as OSH for naproxen overdose, concerned for suicide attempt, transferred to [facility] MICU for further monitoring for naproxen toxicity. Patient took approximately 70 250mg tabs of naproxen after a disagreement with parents. She stated she was unable to vomit the pills at home, and her mother took her to the hospital at about 2100. As she was en route to the hospital, she vomited several times. Pt was brought to [facility] ED, transferred to [facility]. Found to be obtunded in ED, concern for naproxen toxicity vs reaction to Reglan vs psychogenic. Labs significant for anion gap metabolic acidosis. Received IV D       OBJECTIVE --  Vital Signs Last 24 Hrs  T(C): 36.6 (18 Aug 2020 17:00), Max: 36.8 (18 Aug 2020 12:00)  T(F): 97.8 (18 Aug 2020 17:00), Max: 98.2 (18 Aug 2020 12:00)  HR: 68 (18 Aug 2020 19:00) (66 - 126)  BP: 111/56 (18 Aug 2020 19:00) (91/54 - 148/74)  BP(mean): 78 (18 Aug 2020 19:00) (66 - 100)  RR: 21 (18 Aug 2020 19:00) (12 - 35)  SpO2: 97% (18 Aug 2020 19:00) (97% - 100%)    I&O's Summary    17 Aug 2020 07:01  -  18 Aug 2020 07:00  --------------------------------------------------------  IN: 100 mL / OUT: 500 mL / NET: -400 mL    18 Aug 2020 07:01  -  18 Aug 2020 19:37  --------------------------------------------------------  IN: 1215 mL / OUT: 200 mL / NET: 1015 mL        MEDICATIONS  (STANDING):  chlorhexidine 4% Liquid 1 Application(s) Topical <User Schedule>  dextrose 5% + lactated ringers. 1000 milliLiter(s) (75 mL/Hr) IV Continuous <Continuous>  enoxaparin Injectable 40 milliGRAM(s) SubCutaneous daily          LABS                                            11.4                  Neurophils% (auto):   92.2   (08-18 @ 02:30):    19.43)-----------(280          Lymphocytes% (auto):  0.0                                           34.3                   Eosinphils% (auto):   0.0      Manual%: Neutrophils x    ; Lymphocytes x    ; Eosinophils x    ; Bands%: 4.3  ; Blasts x                                    138    |  103    |  7                   Calcium: 8.8   / iCa: x      (08-18 @ 15:32)    ----------------------------<  97        Magnesium: x                                3.0     |  18     |  0.70             Phosphorous: x        TPro  7.3    /  Alb  4.4    /  TBili  <0.1   /  DBili  x      /  AST  25     /  ALT  14     /  AlkPhos  52     18 Aug 2020 02:30            ASSESSMENT & PLAN:   18yo F with hx of drug overdose and depression presents at OSH for naproxen overdose, concerned for suicide attempt, transferred to [facility] MICU for further monitoring for naproxen toxicity. Found to be obtunded in anion gap metabolic acidosis. Currently AOx3 with normal pH and closure of anion gap.     #Neurology  -s/p Zofran, Reglan, Benadryl, and Ativan in ED, CT head neg  -In ED, was obtunded likely 2/2 to Zofran vs naproxen overdose vs psychogenic  -Returned to baseline (AOX3) in unit      //Naproxen Overdose  -monitor for seizures, q4hr neuro checks  -VBG q2 for pH per toxicology recs, d/c'ed with normal pH  -Psychiatry consult  -1:1    #CV  -no active issues  -EKG QTc 508    #Respiratory  -no active issues  - protecting airway, satting well on RA    #GI  -no active issues   -Regular diet    #ID  Leukocytosis   -concerns for aspiration PNA  -f/u sputum, blood cultures, and UA  -If pt has fever, CXR, start zosyn after cultures collected    #Heme-  Anion Gap Metabolic acidosis, resolved  -Likely 2/2 NSAID toxicity    #Psych  //Hx of Depression  -2nd attempt with naproxen Overdose  -Psych following        For Follow-Up:  [ ]   [ ] MICU Transfer Note  ---------------------------    Transfer from: MICU  Transfer to:  (  ) Medicine    (  ) Telemetry    (  ) RCU    (  ) Palliative    (  ) Stroke Unit    (  ) _______________  Accepting Physician:      MICU COURSE  18yo F with hx of drug overdose and depression presents as OSH for naproxen overdose, concerned for suicide attempt, transferred to [facility] MICU for further monitoring for naproxen toxicity. Patient took approximately 70 250mg tabs of naproxen after a disagreement with parents. She stated she was unable to vomit the pills at home, and her mother took her to the hospital at about 2100. As she was en route to the hospital, she vomited several times. Pt was brought to [facility] ED, transferred to [facility] for further care. Found to be obtunded in ED, concern for naproxen toxicity vs reaction to Reglan vs psychogenic. Was back to baseline (AOx4) after being brought to MICU. Labs significant for anion gap metabolic acidosis. Gap closed on repeat CMP. Repeat VBGs showed normal pH and alkalosis.    Patient currently afebrile and hemodynamically stable.          OBJECTIVE --  Vital Signs Last 24 Hrs  T(C): 36.6 (18 Aug 2020 17:00), Max: 36.8 (18 Aug 2020 12:00)  T(F): 97.8 (18 Aug 2020 17:00), Max: 98.2 (18 Aug 2020 12:00)  HR: 68 (18 Aug 2020 19:00) (66 - 126)  BP: 111/56 (18 Aug 2020 19:00) (91/54 - 148/74)  BP(mean): 78 (18 Aug 2020 19:00) (66 - 100)  RR: 21 (18 Aug 2020 19:00) (12 - 35)  SpO2: 97% (18 Aug 2020 19:00) (97% - 100%)    I&O's Summary    17 Aug 2020 07:01  -  18 Aug 2020 07:00  --------------------------------------------------------  IN: 100 mL / OUT: 500 mL / NET: -400 mL    18 Aug 2020 07:01  -  18 Aug 2020 19:37  --------------------------------------------------------  IN: 1215 mL / OUT: 200 mL / NET: 1015 mL        MEDICATIONS  (STANDING):  chlorhexidine 4% Liquid 1 Application(s) Topical <User Schedule>  dextrose 5% + lactated ringers. 1000 milliLiter(s) (75 mL/Hr) IV Continuous <Continuous>  enoxaparin Injectable 40 milliGRAM(s) SubCutaneous daily          LABS                                            11.4                  Neurophils% (auto):   92.2   (08-18 @ 02:30):    19.43)-----------(280          Lymphocytes% (auto):  0.0                                           34.3                   Eosinphils% (auto):   0.0      Manual%: Neutrophils x    ; Lymphocytes x    ; Eosinophils x    ; Bands%: 4.3  ; Blasts x                                    138    |  103    |  7                   Calcium: 8.8   / iCa: x      (08-18 @ 15:32)    ----------------------------<  97        Magnesium: x                                3.0     |  18     |  0.70             Phosphorous: x        TPro  7.3    /  Alb  4.4    /  TBili  <0.1   /  DBili  x      /  AST  25     /  ALT  14     /  AlkPhos  52     18 Aug 2020 02:30            ASSESSMENT & PLAN:   18yo F with hx of drug overdose and depression presents at OSH for naproxen overdose, concerned for suicide attempt, transferred to [facility] MICU for further monitoring for naproxen toxicity. Found to be obtunded in anion gap metabolic acidosis. Currently AOx3 with normal pH and closure of anion gap.     #Neurology  -s/p Zofran, Reglan, Benadryl, and Ativan in ED, CT head neg  -In ED, was obtunded likely 2/2 to Zofran vs naproxen overdose vs psychogenic  -Returned to baseline (AOX3) in unit      //Naproxen Overdose  -monitor for seizures, q4hr neuro checks  -VBG q2 for pH per toxicology recs, d/c'ed with normal pH  -Psychiatry consult  -1:1    #CV  -no active issues  -EKG QTc 508    #Respiratory  -no active issues  - protecting airway, satting well on RA    #GI  -no active issues   -Regular diet    #ID  Leukocytosis   -concerns for aspiration PNA  -f/u sputum, blood cultures, and UA  -If pt has fever, CXR, start zosyn after cultures collected    #Heme-  Anion Gap Metabolic acidosis, resolved  -Likely 2/2 NSAID toxicity    #Psych  //Hx of Depression  -2nd attempt with naproxen Overdose  -Evaluated by psych        For Follow-Up:  [ ]   [ ] MICU Transfer Note  ---------------------------    Transfer from: MICU  Transfer to:  (X) Medicine    (  ) Telemetry    (  ) RCU    (  ) Palliative    (  ) Stroke Unit    (  ) _______________  Accepting Physician: Dr. Carlos      MICU COURSE  18yo F with hx of drug overdose and depression presents as OSH for naproxen overdose, concerned for suicide attempt, transferred to [facility] MICU for further monitoring for naproxen toxicity. Patient took approximately 70 250mg tabs of naproxen after a disagreement with parents. She stated she was unable to vomit the pills at home, and her mother took her to the hospital at about 2100. As she was en route to the hospital, she vomited several times. Pt was brought to [facility] ED, transferred to [facility] for further care. Found to be obtunded in ED, concern for naproxen toxicity vs reaction to Reglan vs psychogenic. Was back to baseline (AOx4) after being brought to MICU. Labs significant for anion gap metabolic acidosis. Gap closed on repeat CMP. Repeat VBGs showed normal pH and alkalosis.    Patient currently afebrile and hemodynamically stable to be transferred to floors      OBJECTIVE --  Vital Signs Last 24 Hrs  T(C): 36.6 (18 Aug 2020 17:00), Max: 36.8 (18 Aug 2020 12:00)  T(F): 97.8 (18 Aug 2020 17:00), Max: 98.2 (18 Aug 2020 12:00)  HR: 68 (18 Aug 2020 19:00) (66 - 126)  BP: 111/56 (18 Aug 2020 19:00) (91/54 - 148/74)  BP(mean): 78 (18 Aug 2020 19:00) (66 - 100)  RR: 21 (18 Aug 2020 19:00) (12 - 35)  SpO2: 97% (18 Aug 2020 19:00) (97% - 100%)    I&O's Summary    17 Aug 2020 07:01  -  18 Aug 2020 07:00  --------------------------------------------------------  IN: 100 mL / OUT: 500 mL / NET: -400 mL    18 Aug 2020 07:01  -  18 Aug 2020 19:37  --------------------------------------------------------  IN: 1215 mL / OUT: 200 mL / NET: 1015 mL        MEDICATIONS  (STANDING):  chlorhexidine 4% Liquid 1 Application(s) Topical <User Schedule>  dextrose 5% + lactated ringers. 1000 milliLiter(s) (75 mL/Hr) IV Continuous <Continuous>  enoxaparin Injectable 40 milliGRAM(s) SubCutaneous daily          LABS                                            11.4                  Neurophils% (auto):   92.2   (08-18 @ 02:30):    19.43)-----------(280          Lymphocytes% (auto):  0.0                                           34.3                   Eosinphils% (auto):   0.0      Manual%: Neutrophils x    ; Lymphocytes x    ; Eosinophils x    ; Bands%: 4.3  ; Blasts x                                    138    |  103    |  7                   Calcium: 8.8   / iCa: x      (08-18 @ 15:32)    ----------------------------<  97        Magnesium: x                                3.0     |  18     |  0.70             Phosphorous: x        TPro  7.3    /  Alb  4.4    /  TBili  <0.1   /  DBili  x      /  AST  25     /  ALT  14     /  AlkPhos  52     18 Aug 2020 02:30            ASSESSMENT & PLAN:   18yo F with hx of drug overdose and depression presents at OSH for naproxen overdose, concerned for suicide attempt, transferred to [facility] MICU for further monitoring for naproxen toxicity. Found to be obtunded in anion gap metabolic acidosis. Currently AOx3 with normal pH and closure of anion gap.     #Neurology  -s/p Zofran, Reglan, Benadryl, and Ativan in ED, CT head neg  -In ED, was obtunded likely 2/2 to Zofran vs naproxen overdose vs psychogenic  -Returned to baseline (AOX3) in unit      //Naproxen Overdose  -monitor for seizures, q4hr neuro checks  -VBG q2 for pH per toxicology recs, d/c'ed with normal pH  -Psychiatry consult  -1:1    #CV  -no active issues  -EKG QTc 508    #Respiratory  -no active issues  - protecting airway, satting well on RA    #GI  -no active issues   -Regular diet    #ID  Leukocytosis   -concerns for aspiration PNA  -f/u sputum, blood cultures, and UA  -If pt has fever, CXR, start zosyn after cultures collected    #Heme-  Anion Gap Metabolic acidosis, resolved  -Likely 2/2 NSAID toxicity    #Psych  //Hx of Depression  -2nd attempt with naproxen Overdose  -Evaluated by psych      For Follow-Up:  [ ] f/u toxicology recs   [ ] outpatient psychiatry f/u  [ ]BMP and vbg q12 MICU Transfer Note  ---------------------------    Transfer from: MICU  Transfer to:  (X) Medicine    (  ) Telemetry    (  ) RCU    (  ) Palliative    (  ) Stroke Unit    (  ) _______________  Accepting Physician: Dr. Carlos      MICU COURSE  20yo F with hx of drug overdose and depression presents as OSH for naproxen overdose, concerned for suicide attempt, transferred to [facility] MICU for further monitoring for naproxen toxicity. Patient took approximately 70 250mg tabs of naproxen after a disagreement with parents. She stated she was unable to vomit the pills at home, and her mother took her to the hospital at about 2100. As she was en route to the hospital, she vomited several times. Pt was brought to [facility] ED, transferred to [facility] for further care. Found to be obtunded in ED, concern for naproxen toxicity vs reaction to Reglan vs psychogenic. Was back to baseline (AOx4) after being brought to MICU. Labs significant for anion gap metabolic acidosis. Gap closed on repeat CMP. Repeat VBGs showed normal pH and alkalosis.    Patient currently afebrile and hemodynamically stable to be transferred to floors      OBJECTIVE --  Vital Signs Last 24 Hrs  T(C): 36.6 (18 Aug 2020 17:00), Max: 36.8 (18 Aug 2020 12:00)  T(F): 97.8 (18 Aug 2020 17:00), Max: 98.2 (18 Aug 2020 12:00)  HR: 68 (18 Aug 2020 19:00) (66 - 126)  BP: 111/56 (18 Aug 2020 19:00) (91/54 - 148/74)  BP(mean): 78 (18 Aug 2020 19:00) (66 - 100)  RR: 21 (18 Aug 2020 19:00) (12 - 35)  SpO2: 97% (18 Aug 2020 19:00) (97% - 100%)    I&O's Summary    17 Aug 2020 07:01  -  18 Aug 2020 07:00  --------------------------------------------------------  IN: 100 mL / OUT: 500 mL / NET: -400 mL    18 Aug 2020 07:01  -  18 Aug 2020 19:37  --------------------------------------------------------  IN: 1215 mL / OUT: 200 mL / NET: 1015 mL        MEDICATIONS  (STANDING):  chlorhexidine 4% Liquid 1 Application(s) Topical <User Schedule>  dextrose 5% + lactated ringers. 1000 milliLiter(s) (75 mL/Hr) IV Continuous <Continuous>  enoxaparin Injectable 40 milliGRAM(s) SubCutaneous daily          LABS                                            11.4                  Neurophils% (auto):   92.2   (08-18 @ 02:30):    19.43)-----------(280          Lymphocytes% (auto):  0.0                                           34.3                   Eosinphils% (auto):   0.0      Manual%: Neutrophils x    ; Lymphocytes x    ; Eosinophils x    ; Bands%: 4.3  ; Blasts x                                    138    |  103    |  7                   Calcium: 8.8   / iCa: x      (08-18 @ 15:32)    ----------------------------<  97        Magnesium: x                                3.0     |  18     |  0.70             Phosphorous: x        TPro  7.3    /  Alb  4.4    /  TBili  <0.1   /  DBili  x      /  AST  25     /  ALT  14     /  AlkPhos  52     18 Aug 2020 02:30            ASSESSMENT & PLAN:   20yo F with hx of drug overdose and depression presents at OSH for naproxen overdose, concerned for suicide attempt, transferred to [facility] MICU for further monitoring for naproxen toxicity. Found to be obtunded in anion gap metabolic acidosis. Currently AOx3 with normal pH and closure of anion gap.     #Neurology  -s/p Zofran, Reglan, Benadryl, and Ativan in ED, CT head neg  -In ED, was obtunded likely 2/2 to Zofran vs naproxen overdose vs psychogenic  -Returned to baseline (AOX3) in unit      //Naproxen Overdose  -monitor for seizures, q4hr neuro checks  -VBG q2 for pH per toxicology recs, d/c'ed with normal pH  -Psychiatry consult  -1:1    #CV  -no active issues  -EKG QTc 508    #Respiratory  -no active issues  - protecting airway, satting well on RA    #GI  -no active issues   -Regular diet    #ID  Leukocytosis   -concerns for aspiration PNA  -f/u sputum, blood cultures, and UA  -If pt has fever, CXR, start zosyn after cultures collected    #Heme-  Anion Gap Metabolic acidosis, resolved  -Likely 2/2 NSAID toxicity    #Psych  //Hx of Depression  -2nd attempt with naproxen Overdose  -Evaluated by psych      For Follow-Up:  [ ] f/u toxicology recs   [ ] Psych recommends admission for continued SI  [ ] BMP and vbg q12

## 2020-08-18 NOTE — BEHAVIORAL HEALTH ASSESSMENT NOTE - NS ED BHA SUICIDALITY PRESENT CURRENT INTENT DETAILS COLLATERAL FT
Mother present, states she was unaware of pts self mutilation. States pts depression has worsened since COVID, and her brothers severe anxiety with COIVD is a factor adding to the stressful home environment

## 2020-08-18 NOTE — ED PROVIDER NOTE - OBJECTIVE STATEMENT
20 y/o f trx from [place], s/p naproxen overdose at 1900 last night. Reported taking ~70 pills of 250 mg naproxen. had nausea and vomiting with self decon, no other problems reported. pt was not reported to have taken any other medications. pt was reported to be hypokalemia and received 3-10mg runs of k. ekg intervals were normal at that time. due to nausea pt received zofran then reglan. shortly after reglan pt mental status deteriorated, she was alert but only responding 1 words, also became very fidgety, thought to be akathisia. On presentation, pt non verbal and agitated, tachycardic.

## 2020-08-18 NOTE — BEHAVIORAL HEALTH ASSESSMENT NOTE - VIOLENCE PROTECTIVE FACTORS:
Residential stability/Insight into violence risk and need for management/treatment/Employment stability

## 2020-08-18 NOTE — ED ADULT NURSE NOTE - OBJECTIVE STATEMENT
19 year old female transferred fro [facility] for NSAID overdose. As per EMS pt took a whole bottle of nyproxen for attempted suicide. At OSH she presented Aox3 they then gave her Reglan, Zofran, and Benadryl. After the medications the patient became altered. Pt present to [facility] ED confused, nonverbal,  combative. Pt is pulling at line. Pt is attempting to masturbate during assessment. Pt is moving all extremities, attempting to get out of bed, kicking, and hitting staff. Pt urinated in the bed. Breathing is clear and unlabored. Pt has scratch wounds on right leg.

## 2020-08-18 NOTE — ED PROVIDER NOTE - CLINICAL SUMMARY MEDICAL DECISION MAKING FREE TEXT BOX
18 y/o f trx from [place], s/p naproxen overdose at 1900 last night. Reported taking ~70 pills of 250 mg naproxen. acute AMS likely 2/2 nsaid overdose since high dose intake may cross BBB but can also be 2/2 reglan causing akithisia. agitated here, will give 1mg ativan, scan head and rpt tox labs. micu consult.

## 2020-08-18 NOTE — ED PROVIDER NOTE - PHYSICAL EXAMINATION
GEN: non verbal, agitated and altered   HEAD: NCAT  HEENT: PERRL, roving eyes when opened, no nystagmus, no facial droop,  MMM, neck supple, no LAD, no JVD, no raccoon sign, no fuentes sign   LUNG: CTAB, no adventitious sounds, no retractions, no nasal flaring  CV: RRR, no murmurs,   Abd: soft, NTND, no rebound or guarding, BS+ in all quadrants, no CVAT  MSK: WWP, Pulses 2+ in extremities, No edema, no visible deformities, strength 5/5 in all extremities, FROM, no midspine TTP  Neuro:   nonverbal, GIRARD, no myoclonus or rigidity   Psych: agitated,

## 2020-08-18 NOTE — BEHAVIORAL HEALTH ASSESSMENT NOTE - ACCESS TO FIREARM
Rebecca/Unable to assess

## 2020-08-18 NOTE — ED PROVIDER NOTE - ATTENDING CONTRIBUTION TO CARE
MD Loredo:  patient seen and evaluated personally.   I agree with the History & Physical,  Impression & Plan other than what was detailed in my note.  MD Loredo  Hx taken from EMR and trx doc considering pt not responding   18 y/o f trx from [place], s/p naproxen overdose at 1900 last night. Reported 250 mg pills with 100 in the bottle. pt stated bottle had 100 when full but was not full. States took a lot. had nausea and vomiting with self decon, no other problems reported. pt was not reported to have taken any other medications. pt was reported to be hypokalemia and received 3 10mg boluses of k. ekg intervals were normal at that time. due to nausea pt received zofran then reglan. shortly after reglan pt mental status deteriorated, she was alert but only responding 1 words, also became very figidity, thought to be akathesia so was given benadryl. no improvement so trx here after discussing w/ myself and icu. afebrile pt mildly tachycardic other vitals stable, moving all extremities somewhat figiting at the bedside. pt does have purposeful movement. alert but not responding to questions, protecting airway, pt's writhing is making her a potential danger to herself so did give 1 mg of ativan. pt has normal pupils, eyes roving, but no noteable nystagmus, no tremors or tongue fasiculations to support withdrawal, no flushed skin, no clonus or rigidity. no signs of any specific toxidrome .repeat ekg in our ed shows sinus, qrs 82, qtc 508, will avoid further qt prolonging meds, replete mag as is low side of normal at 1.6, will give 2 grams, also 20 meq more of k, will repeat cmp, ck, ct head. will obs in ed and discuss further w/ icu to determine best disposition. possible nsaid induced cns alteration. less likely a actionable meninigitis at this point although drug induced aseptic menignitis has been reported trx is generally supportive care. pt has potential to become sick so will obs in critical area. MD Loredo:  patient seen and evaluated personally.   I agree with the History & Physical,  Impression & Plan other than what was detailed in my note.  MD Loredo  Hx taken from EMR and trx doc considering pt not responding.   20 y/o f trx from [place], s/p naproxen overdose at 1900 last night. Reported 250 mg pills with 100 in the bottle. pt stated bottle had 100 when full but was not full. States took a lot. had nausea and vomiting with self decon, no other problems reported. pt was not reported to have taken any other medications. pt was reported to be hypokalemia and received 3 10mg boluses of k. ekg intervals were normal at that time. due to nausea pt received zofran then reglan. shortly after reglan pt mental status deteriorated, she was alert but only responding 1 words, also became very figidity, thought to be akathesia so was given benadryl. no improvement so trx here after discussing w/ myself and icu. afebrile pt mildly tachycardic other vitals stable, moving all extremities somewhat figiting at the bedside. pt does have purposeful movement. alert but not responding to questions, protecting airway, pt's writhing is making her a potential danger to herself so did give 1 mg of ativan. pt has normal pupils, eyes roving, but no noteable nystagmus, no tremors or tongue fasiculations to support withdrawal, no flushed skin, no clonus or rigidity. no signs of any specific toxidrome .repeat ekg in our ed shows sinus, qrs 82, qtc 508, will avoid further qt prolonging meds, replete mag as is low side of normal at 1.6, will give 2 grams, also 20 meq more of k, will repeat cmp, ck, ct head. will obs in ed and discuss further w/ icu to determine best disposition. possible nsaid induced cns alteration. less likely a actionable meninigitis at this point although drug induced aseptic menignitis has been reported trx is generally supportive care. pt has potential to become sick so will obs in critical area.

## 2020-08-18 NOTE — CONSULT NOTE ADULT - ASSESSMENT
20yo F with hx of drug overdose and depression presents as OSH for naproxen overdose with suicidal attempt. MICU consulted    #Naproxen Overdose  -neurochecks q4hr. Monitor for seziures  -Vitals q4hr for hypotension  -EKG NSR with Qtc 508    **Incomplete recs

## 2020-08-18 NOTE — BEHAVIORAL HEALTH ASSESSMENT NOTE - HPI (INCLUDE ILLNESS QUALITY, SEVERITY, DURATION, TIMING, CONTEXT, MODIFYING FACTORS, ASSOCIATED SIGNS AND SYMPTOMS)
18yo F, domiciles with family, pphx of depression and prior drug OD suicide attempt in 2017, no prior hospital admissions. Pt was brought to [facility] ED, transferred to [facility] and admitted to ICU for intentional Naproxen overdose at attempted suicide. Psychiatry consult for possible inpatient psychiatry admission.  Patient presents A&Ox4,  remorseless with inappropriate levity to suicide attempt. States she does not regret either attempt at suicide, but denies current SI. Patient states she felt a sudden urge of "feeling done" and wanting to end her life when she got angry at her brother and parents. She states she took~70 250mg Naproxen tablets with the intent to end her life. Patient states she does not know how to control her outbursts of anger without being destructive towards herself or her property. Patient admits to destroying her room twice in a week when she got angry and admits to self mutilation by cutting herself with a razor 1 week prior to suicide attempt. She states her depression has worsened since being home because of COVID and she gets random urges to kill herself and intrusive thoughts on why she should do it. She tries to suppress these thoughts with positive thoughts of future plans. Patient admits to feeling depressed with intrusive negative thoughts but lacks appropriateness to context. She denies current anxiety, vidhi, paranoia or hallucinations,  no current SI, and no past history of homicidal ideations.

## 2020-08-18 NOTE — H&P ADULT - ASSESSMENT
18yo F with hx of drug overdose and depression presents as OSH for naproxen overdose, concerned for suicide attempt, transferred to [facility] MICU for further monitoring for naproxen toxicity.     #Neurology  -baseline AOx3  -s/p Zofran, Reglan, Benadryl, and Ativan in ED  -AOx0, nonverbal, arousable, not follows commands  -if no mental status improvement, CTH    //Naproxen Overdose  -monitor for seizures, q4hr neuro checks  -toxicology recs apperciated  -psychiatry consult once mental status improves  -one to one   -EKG Qtc 508    #CV  -no active issues    #Respiratory  -no active issues    #GI  -no active issues     #ID  Leukocytosis   -concerns for aspiration PNA  -f/u sputum, blood cultures, and UA  -start zosyn after cultures collected    #Psych  //Hx of Depression  -2nd attempt with naproxen Overdose 18yo F with hx of drug overdose and depression presents as OSH for naproxen overdose, concerned for suicide attempt, transferred to [facility] MICU for further monitoring for naproxen toxicity.     #Neurology  -baseline AOx3  -s/p Zofran, Reglan, Benadryl, and Ativan in ED  -AOx0, nonverbal, arousable, not follows commands  -if no mental status improvement, CTH    //Naproxen Overdose  -monitor for seizures, q4hr neuro checks  -toxicology recs apperciated  -psychiatry consult once mental status improves  -one to one   -EKG Qtc 508    #CV  -no active issues    #Respiratory  -no active issues    #GI  -no active issues     #ID  Leukocytosis   -concerns for aspiration PNA  -f/u sputum, blood cultures, and UA  -start zosyn after cultures collected    #Heme-  Anion Gap Metabolic acidosis  -Likely 2/2 NSAID toxicity, monitor for resolution in 24-48 hours    #Psych  //Hx of Depression  -2nd attempt with naproxen Overdose 18yo F with hx of drug overdose and depression presents as OSH for naproxen overdose, concerned for suicide attempt, transferred to [facility] MICU for further monitoring for naproxen toxicity.     #Neurology  -baseline AOx3  -s/p Zofran, Reglan, Benadryl, and Ativan in ED  -AOx0, nonverbal, arousable, not follows commands  -if no mental status improvement, CTH    //Naproxen Overdose  -monitor for seizures, q4hr neuro checks  -toxicology recs apperciated  -psychiatry consult once mental status improves  -one to one   -EKG Qtc 508    #CV  -no active issues    #Respiratory  -no active issues    #GI  -no active issues     #ID  Leukocytosis   -concerns for aspiration PNA  -f/u sputum, blood cultures, and UA  -start zosyn after cultures collected    #Heme-  Anion Gap Metabolic acidosis  -Likely 2/2 NSAID toxicity, monitor for resolution in 24-48 hours    #Psych  //Hx of Depression  -2nd attempt with naproxen Overdose  -Psych consult 20yo F with hx of drug overdose and depression presents as OSH for naproxen overdose, concerned for suicide attempt, transferred to [facility] MICU for further monitoring for naproxen toxicity.     #Neurology  -baseline AOx3  -s/p Zofran, Reglan, Benadryl, and Ativan in ED  -AOx0, nonverbal, arousable, not follows commands  -Neg CTH  -Pt returned to baseline A/Ox3 in MICU    //Naproxen Overdose  -monitor for seizures, q4hr neuro checks  -toxicology recs apperciated  -psychiatry consult once mental status improves  -one to one   -EKG Qtc 508    #CV  -no active issues    #Respiratory  -no active issues    #GI  -no active issues     #ID  Leukocytosis   -concerns for aspiration PNA  -f/u sputum, blood cultures, and UA  -If pt has fever, CXR, start zosyn after cultures collected    #Heme-  Anion Gap Metabolic acidosis  -Likely 2/2 NSAID toxicity, monitor for resolution in 24-48 hours    #Psych  //Hx of Depression  -2nd attempt with naproxen Overdose  -Psych consult

## 2020-08-18 NOTE — H&P ADULT - NSHPLABSRESULTS_GEN_ALL_CORE
11.4   19.43 )-----------( 280      ( 18 Aug 2020 02:30 )             34.3   08-18    140  |  103  |  7   ----------------------------<  136<H>  3.9   |  16<L>  |  0.60    Ca    8.1<L>      18 Aug 2020 02:30  Phos  1.8     08-18  Mg     1.7     08-18    TPro  7.3  /  Alb  4.4  /  TBili  <0.1<L>  /  DBili  x   /  AST  25  /  ALT  14  /  AlkPhos  52  08-18

## 2020-08-18 NOTE — ED PROVIDER NOTE - CRITICAL CARE ATTESTATION
I have personally provided the amount of critical care time documented below concurrently with the resident/fellow.  This time excludes time spent on separate procedures and time spent teaching. I have reviewed the resident’s/fellow’s documentation and I agree with the assessment and plan of care c/w simvastatin

## 2020-08-18 NOTE — BEHAVIORAL HEALTH ASSESSMENT NOTE - SUMMARY
20yo F, domiciles with family, pphx of depression and prior drug OD suicide attempt in 2017, no prior hospital admissions. Pt was brought to [facility] ED, transferred to [facility] and admitted to ICU for intentional Naproxen overdose at attempted suicide. Psychiatry consult for possible inpatient psychiatry admission.  Patient presents A&Ox4,  remorseless with inappropriate levity to suicide attempt.  Patient states she felt a sudden urge of "feeling done" and wanting to end her life when she got angry at her brother and parents. She states she took~70 250mg Naproxen tablets with the intent to end her life.  She states her depression has worsened since being home because of COVID and she gets random urges to kill herself and intrusive thoughts on why she should do it.  Patient admits to feeling depressed with intrusive negative thoughts but lacks appropriateness to context. She denies current anxiety, vidhi, paranoia or hallucinations,  no current SI, and no past history of homicidal ideations. Pt is a 18yo single woman, domiciled with family, pphx of depression and prior drug OD suicide attempt in 2017, no prior psychiatric hospital admissions. Pt was brought to [facility] ED, transferred to [facility] and admitted to ICU for intentional Naproxen overdose at attempted suicide. Psychiatry consult for possible inpatient psychiatry admission.  Patient presents A&Ox4, with some levity regarding suicide attempt, and says she does not regret having made this attempt.  Patient states she felt a sudden urge of "feeling done" and wanting to end her life when she got angry at her brother and parents. She states she took~70 250mg Naproxen tablets with the intent to end her life.  She states her depression has worsened since being home because of COVID and she gets random urges to kill herself and intrusive thoughts on why she should do it.  Patient admits to feeling depressed with intrusive negative thoughts but lacks appropriateness to context. She denies current anxiety, vidhi, paranoia or hallucinations,  no current SI, and no past history of homicidal ideations.

## 2020-08-18 NOTE — ED PROVIDER NOTE - CARE PLAN
Principal Discharge DX:	NSAID overdose, intentional self-harm, initial encounter  Secondary Diagnosis:	AMS (altered mental status)  Secondary Diagnosis:	Nausea and vomiting  Secondary Diagnosis:	Hypokalemia

## 2020-08-18 NOTE — BEHAVIORAL HEALTH ASSESSMENT NOTE - NSBHCHARTREVIEWINVESTIGATE_PSY_A_CORE FT
< from: 12 Lead ECG (08.17.20 @ 21:57) >    Ventricular Rate 92 BPM    Atrial Rate 92 BPM    P-R Interval 136 ms    QRS Duration 82 ms    Q-T Interval 368 ms    QTC Calculation(Bezet) 455 ms    P Axis 71 degrees    R Axis 74 degrees    T Axis 44 degrees    Diagnosis Line Normal sinus rhythm with sinus arrhythmia  Incomplete right bundle branch block  No previous ECGs available  Confirmed by Palla MD, Demario ([id number] on 8/18/2020 3:38:53 PM    < end of copied text >

## 2020-08-18 NOTE — BEHAVIORAL HEALTH ASSESSMENT NOTE - NSBHADMITIPSTRENGTH_PSY_A_CORE
Has supportive interpersonal relationships with family, friends or peers/Has access to housing/residential stability/Awareness of substance use issues/In good physical health/Has vocational interests or hobbies/Steady employment/Financial stability

## 2020-08-18 NOTE — BEHAVIORAL HEALTH ASSESSMENT NOTE - CASE SUMMARY
Pt is a 20yo single woman, domiciled with family, pphx of depression and prior drug OD suicide attempt in 2017, no prior psychiatric hospital admissions. Pt was brought to [facility] ED, transferred to [facility] and admitted to ICU for intentional Naproxen overdose at attempted suicide. Psychiatry consult for possible inpatient psychiatry admission.  Patient presents A&Ox4, with some levity regarding suicide attempt, and says she does not regret having made this attempt. She also has a recent history of cutting herself superficially with a razor, which her mother was not aware of.  Pt understands that she will need psychiatric admission after she is medically cleared because of the serious nature of her attempt and continued thoughts of suicide.

## 2020-08-18 NOTE — CHART NOTE - NSCHARTNOTEFT_GEN_A_CORE
Pt admitted to MICU for obtundation after suicide attempt with naprosyn. Pt now awake and follows commands. Pt with metabolic acidosis due to metabolites of naprosyn. Will follow serum electrolytes q4-6 hours. Check vbg q 2-4 hrs for ph. No evidence of renal failure or GI symptoms at his point.   Continue MICU monitoring as above.  Psychiatric consult for depression and suicide attempt. Continue 1:1 monitoring.  Start D5LR until pr stars eating again. Ketosis likely  starvation Ketosis. No evidence for DKA.  Consult Toxicology.      Critical care time 35 min not including procedures.    Claudy Chaudhary MD

## 2020-08-18 NOTE — H&P ADULT - NSHPREVIEWOFSYSTEMS_GEN_ALL_CORE
Constitutional:  (-) fever, (-) chills, (+) mild confusion  Cardiac: (-) chest pain (-) palpitations   Respiratory:  (-) cough (-) respiratory distress.   GI:  (-) nausea (-) vomiting (-) diarrhea (-) abdominal pain.  :  (-) dysuria (-) frequency (-) burning.  MS:  (-) extremity pain  Neuro:  (-) headache (-) numbness (-) tingling (-) focal weakness  Except as documented in the HPI,  all other systems are negative

## 2020-08-18 NOTE — H&P ADULT - HISTORY OF PRESENT ILLNESS
18yo F with hx of drug overdose and depression presents as OSH for naproxen overdose, concerned for suicide attempt, transferred to [facility] MICU for further monitoring for naproxen toxicity. As per hx obtained from ED, she was upset about her laptop breaking, and at 1900, she began taking naproxen 250mg tabs by the handful from a partially full 100 tablet bottle naproxen bottle. She stated she vomited several times en route to hospital. Denies any other co-ingestion. Pt has hx of prior OD using Motrin when she was 16. She states she had previously been treated by a psychiatrist for depression, but is currently not taking any medications after she felt none were working. 20yo F with hx of drug overdose and depression presents as OSH for naproxen overdose, concerned for suicide attempt, transferred to [facility] MICU for further monitoring for naproxen toxicity. She states she was arguing with her parents today, and was upset about her laptop breaking, and at 1900, she felt a sudden urge of SI. She states she began taking naproxen 250mg tabs by the handful from a partially full 100 tablet bottle naproxen bottle until it was finished, and she approximates that she took ~70 pills. She stated she was unable to vomit the pills at home, and her mother took her to the hospital at about 2100. As she was en route to the hospital, she vomited several times. She Denies any other co-ingestion. Pt has hx of prior OD using Motrin when she was 16. She states she had previously been treated by a psychiatrist for depression, but is currently not taking any medications after she felt none were working. 18yo F with hx of drug overdose and depression presents as OSH for naproxen overdose, concerned for suicide attempt, transferred to [facility] MICU for further monitoring for naproxen toxicity. She states she was arguing with her parents today, and was upset about her laptop breaking, and at 1900, she felt a sudden urge of "feeling done" and felt SI. She states she began taking naproxen 250mg tabs by the handful from a partially full 100 tablet bottle naproxen bottle until it was finished, and she approximates that she took ~70 pills. She stated she was unable to vomit the pills at home, and her mother took her to the hospital at about 2100. As she was en route to the hospital, she vomited several times. She says since arriving at the hospital, she doesn't remember how she arrived in the MICU. She denies any other co-ingestion. Pt has hx of prior OD using Motrin when she was 16. She states she had previously been treated by a psychiatrist and therapist for depression, but is currently not taking any medications after she felt none were working. She states that she no longer feels suicidal, and says she understands that her overdosing on pills was not an appropriate response to feeling upset. 18yo F with hx of drug overdose and depression presents as OSH for naproxen overdose, concerned for suicide attempt, transferred to [facility] MICU for further monitoring for naproxen toxicity. She states she was arguing with her parents today, and was upset about her laptop breaking, and at 1900, she felt a sudden urge of "feeling done" and felt SI. She states she began taking naproxen 250mg tabs by the handful from a partially full 100 tablet bottle naproxen bottle until it was finished, and she approximates that she took ~70 pills. She stated she was unable to vomit the pills at home, and her mother took her to the hospital at about 2100. As she was en route to the hospital, she vomited several times. She says since arriving at the hospital, she doesn't remember how she arrived in the MICU. She denies any other co-ingestion. Pt has hx of prior OD using Motrin when she was 16. She states she had previously been treated by a psychiatrist and therapist for depression, but is currently not taking any medications after she felt none were working. She states that she no longer feels suicidal, and says she understands that her overdosing on pills was not an appropriate response to feeling upset.    I&O's Summary    17 Aug 2020 07:01  -  18 Aug 2020 06:44  --------------------------------------------------------  IN: 100 mL / OUT: 400 mL / NET: -300 mL    Vital Signs Last 24 Hrs  T(C): 36.6 (18 Aug 2020 04:54), Max: 36.7 (17 Aug 2020 21:04)  T(F): 97.9 (18 Aug 2020 04:54), Max: 98 (17 Aug 2020 21:04)  HR: 111 (18 Aug 2020 06:00) (79 - 126)  BP: 114/64 (18 Aug 2020 06:00) (105/53 - 148/74)  BP(mean): 80 (18 Aug 2020 06:00) (80 - 100)  RR: 28 (18 Aug 2020 06:00) (12 - 28)  SpO2: 97% (18 Aug 2020 06:00) (97% - 100%)    LABS:                        11.4   19.43 )-----------( 280      ( 18 Aug 2020 02:30 )             34.3     08-18    140  |  103  |  7   ----------------------------<  136<H>  3.9   |  16<L>  |  0.60    Ca    8.1<L>      18 Aug 2020 02:30  Phos  1.8     08-18  Mg     1.7     08-18    TPro  7.3  /  Alb  4.4  /  TBili  <0.1<L>  /  DBili  x   /  AST  25  /  ALT  14  /  AlkPhos  52  08-18      CARDIAC MARKERS ( 18 Aug 2020 02:30 )  x     / x     / 148 U/L / x     / x          Urinalysis Basic - ( 18 Aug 2020 06:28 )    Color: Light Yellow / Appearance: Clear / S.016 / pH: x  Gluc: x / Ketone: Large  / Bili: Negative / Urobili: Negative   Blood: x / Protein: Negative / Nitrite: Negative   Leuk Esterase: Negative / RBC: 1 /hpf / WBC 2 /HPF   Sq Epi: x / Non Sq Epi: 1 /hpf / Bacteria: Negative 18yo F with hx of drug overdose and depression presents as OSH for naproxen overdose, concerned for suicide attempt, transferred to [facility] MICU for further monitoring for naproxen toxicity. She states she was arguing with her parents today, and was upset about her laptop breaking, and at 1900, she felt a sudden urge of "feeling done" and felt SI. She states she began taking naproxen 250mg tabs by the handful from a partially full 100 tablet bottle naproxen bottle until it was finished, and she approximates that she took ~70 pills. She stated she was unable to vomit the pills at home, and her mother took her to the hospital at about 2100. As she was en route to the hospital, she vomited several times. Pt was brought to [facility] ED, transferred to [facility],. She says since arriving at the hospital, she doesn't remember how she arrived in the MICU. She denies any other co-ingestion. Pt has hx of prior OD using Motrin when she was 16. She states she had previously been treated by a psychiatrist and therapist for depression, but is currently not taking any medications after she felt none were working. She states that she no longer feels suicidal, and says she understands that her overdosing on pills was not an appropriate response to feeling upset. While in the [place] ED she was given Reglan and Benadryl, and was given lorazepam at [facility] ED.    I&O's Summary    17 Aug 2020 07:01  -  18 Aug 2020 06:44  --------------------------------------------------------  IN: 100 mL / OUT: 400 mL / NET: -300 mL    Vital Signs Last 24 Hrs  T(C): 36.6 (18 Aug 2020 04:54), Max: 36.7 (17 Aug 2020 21:04)  T(F): 97.9 (18 Aug 2020 04:54), Max: 98 (17 Aug 2020 21:04)  HR: 111 (18 Aug 2020 06:00) (79 - 126)  BP: 114/64 (18 Aug 2020 06:00) (105/53 - 148/74)  BP(mean): 80 (18 Aug 2020 06:00) (80 - 100)  RR: 28 (18 Aug 2020 06:00) (12 - 28)  SpO2: 97% (18 Aug 2020 06:00) (97% - 100%)    LABS:                        11.4   19.43 )-----------( 280      ( 18 Aug 2020 02:30 )             34.3     08-18    140  |  103  |  7   ----------------------------<  136<H>  3.9   |  16<L>  |  0.60    Ca    8.1<L>      18 Aug 2020 02:30  Phos  1.8     08-18  Mg     1.7     08-18    TPro  7.3  /  Alb  4.4  /  TBili  <0.1<L>  /  DBili  x   /  AST  25  /  ALT  14  /  AlkPhos  52  08-18      CARDIAC MARKERS ( 18 Aug 2020 02:30 )  x     / x     / 148 U/L / x     / x          Urinalysis Basic - ( 18 Aug 2020 06:28 )    Color: Light Yellow / Appearance: Clear / S.016 / pH: x  Gluc: x / Ketone: Large  / Bili: Negative / Urobili: Negative   Blood: x / Protein: Negative / Nitrite: Negative   Leuk Esterase: Negative / RBC: 1 /hpf / WBC 2 /HPF   Sq Epi: x / Non Sq Epi: 1 /hpf / Bacteria: Negative

## 2020-08-18 NOTE — H&P ADULT - NSHPPHYSICALEXAM_GEN_ALL_CORE
CONSTITUTIONAL: well-appearing, in NAD  SKIN: Warm dry, normal skin turgor  HEAD: NCAT  EYES: EOMI, PERRLA, no scleral icterus, conjunctiva pink  CARD: RRR, no murmurs.  RESP: clear to ausculation b/l. No crackles or wheezing.  ABD: soft, non-tender, non-distended, no rebound or guarding.  EXT: Full ROM, no bony tenderness, no pedal edema, no calf tenderness  NEURO: normal motor. normal sensory. Cerebellar testing normal.   PSYCH: Cooperative, pt not upset but states she regrets attempting suicide.

## 2020-08-18 NOTE — CONSULT NOTE ADULT - SUBJECTIVE AND OBJECTIVE BOX
CHIEF COMPLAINT:    HPI:    PAST MEDICAL & SURGICAL HISTORY:  Depression  No significant past surgical history      FAMILY HISTORY:      SOCIAL HISTORY:  Smoking: __ packs x ___ years  EtOH Use:  Marital Status:  Occupation:  Recent Travel:  Country of Birth:  Advance Directives:    Allergies    No Known Allergies    Intolerances        HOME MEDICATIONS:    REVIEW OF SYSTEMS:  Constitutional:   Eyes:  ENT:  CV:  Resp:  GI:  :  MSK:  Integumentary:  Neurological:  Psychiatric:  Endocrine:  Hematologic/Lymphatic:  Allergic/Immunologic:  [ ] All other systems negative  [ ] Unable to assess ROS because ________    OBJECTIVE:  ICU Vital Signs Last 24 Hrs  T(C): 36.5 (18 Aug 2020 00:34), Max: 36.7 (17 Aug 2020 21:04)  T(F): 97.7 (18 Aug 2020 00:34), Max: 98 (17 Aug 2020 21:04)  HR: 89 (18 Aug 2020 03:01) (89 - 126)  BP: 122/62 (18 Aug 2020 03:01) (105/53 - 148/74)  BP(mean): --  ABP: --  ABP(mean): --  RR: 23 (18 Aug 2020 03:01) (12 - 23)  SpO2: 99% (18 Aug 2020 03:01) (97% - 100%)        CAPILLARY BLOOD GLUCOSE          PHYSICAL EXAM:  General:   HEENT:   Lymph Nodes:  Neck:   Respiratory:   Cardiovascular:   Abdomen:   Extremities:   Skin:   Neurological:  Psychiatry:    HOSPITAL MEDICATIONS:  MEDICATIONS  (STANDING):  potassium chloride  10 mEq/100 mL IVPB 10 milliEquivalent(s) IV Intermittent every 1 hour    MEDICATIONS  (PRN):      LABS:                        11.4   19.43 )-----------( 280      ( 18 Aug 2020 02:30 )             34.3     08-18    142  |  106  |  9   ----------------------------<  147<H>  3.1<L>   |  21<L>  |  1.04    Ca    8.1<L>      18 Aug 2020 00:59  Phos  1.9     -18  Mg     1.6     18    TPro  7.8  /  Alb  4.5  /  TBili  3.6<H>  /  DBili  x   /  AST  24  /  ALT  12  /  AlkPhos  54  08-18      Urinalysis Basic - ( 18 Aug 2020 02:26 )    Color: Colorless / Appearance: Clear / S.014 / pH: x  Gluc: x / Ketone: Large  / Bili: Negative / Urobili: Negative   Blood: x / Protein: Negative / Nitrite: Negative   Leuk Esterase: Negative / RBC: 2 /hpf / WBC 0 /HPF   Sq Epi: x / Non Sq Epi: 1 /hpf / Bacteria: Negative        Venous Blood Gas:   @ 02:27  7.37/32/34/18/59  VBG Lactate: 2.9      MICROBIOLOGY:     RADIOLOGY:  [ ] Reviewed and interpreted by me    EKG: CHIEF COMPLAINT:    HPI:     PAST MEDICAL & SURGICAL HISTORY:  Depression  No significant past surgical history      FAMILY HISTORY:      SOCIAL HISTORY:  Smoking: __ packs x ___ years  EtOH Use:  Marital Status:  Occupation:  Recent Travel:  Country of Birth:  Advance Directives:    Allergies    No Known Allergies    Intolerances        HOME MEDICATIONS:    REVIEW OF SYSTEMS:  Constitutional:   Eyes:  ENT:  CV:  Resp:  GI:  :  MSK:  Integumentary:  Neurological:  Psychiatric:  Endocrine:  Hematologic/Lymphatic:  Allergic/Immunologic:  [ ] All other systems negative  [ ] Unable to assess ROS because ________    OBJECTIVE:  ICU Vital Signs Last 24 Hrs  T(C): 36.5 (18 Aug 2020 00:34), Max: 36.7 (17 Aug 2020 21:04)  T(F): 97.7 (18 Aug 2020 00:34), Max: 98 (17 Aug 2020 21:04)  HR: 89 (18 Aug 2020 03:01) (89 - 126)  BP: 122/62 (18 Aug 2020 03:01) (105/53 - 148/74)  BP(mean): --  ABP: --  ABP(mean): --  RR: 23 (18 Aug 2020 03:01) (12 - 23)  SpO2: 99% (18 Aug 2020 03:01) (97% - 100%)        CAPILLARY BLOOD GLUCOSE          PHYSICAL EXAM:  General:   HEENT:   Lymph Nodes:  Neck:   Respiratory:   Cardiovascular:   Abdomen:   Extremities:   Skin:   Neurological:  Psychiatry:    HOSPITAL MEDICATIONS:  MEDICATIONS  (STANDING):  potassium chloride  10 mEq/100 mL IVPB 10 milliEquivalent(s) IV Intermittent every 1 hour    MEDICATIONS  (PRN):      LABS:                        11.4   19.43 )-----------( 280      ( 18 Aug 2020 02:30 )             34.3     08-18    142  |  106  |  9   ----------------------------<  147<H>  3.1<L>   |  21<L>  |  1.04    Ca    8.1<L>      18 Aug 2020 00:59  Phos  1.9     -18  Mg     1.6     18    TPro  7.8  /  Alb  4.5  /  TBili  3.6<H>  /  DBili  x   /  AST  24  /  ALT  12  /  AlkPhos  54  08-18      Urinalysis Basic - ( 18 Aug 2020 02:26 )    Color: Colorless / Appearance: Clear / S.014 / pH: x  Gluc: x / Ketone: Large  / Bili: Negative / Urobili: Negative   Blood: x / Protein: Negative / Nitrite: Negative   Leuk Esterase: Negative / RBC: 2 /hpf / WBC 0 /HPF   Sq Epi: x / Non Sq Epi: 1 /hpf / Bacteria: Negative        Venous Blood Gas:   @ 02:27  7.37/32/34/18/59  VBG Lactate: 2.9      MICROBIOLOGY:     RADIOLOGY:  [ ] Reviewed and interpreted by me    EKG: CHIEF COMPLAINT:    HPI: 18 yo F with hx depression presents as transfer from [facility] for naxproxen overdose Per ED note, patient took 100 tablets of 250mg naproxen with suicidal attempt.  Mom called posiion control and drove her to ER. Patient locked herself in the bathroom. She ate food. Stomach borthering her. She had 1 ep of emesis in the car with nausea and lightheadedness. In [place] ED, patient was given zofran, reglan, and benadryl  Friend over the house upset. She came downstairs 7:30pm and told her mom she took a handful of naproxen, bottle was empty. Sucidial ideation.   Patient struggled after school finished. Computer malfunction. 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      Patient has hx of OD in the past with motrin age 16. Mother denies hx of patient cutting herself.   She has previously tried cutting herself. Currently, she not on depression medications.     PAST MEDICAL & SURGICAL HISTORY:  Depression  No significant past surgical history    FAMILY HISTORY:    SOCIAL HISTORY:  Smoking: none  EtOH Use: denies alcohol use  Marital Status:  Occupation: student    Allergies    No Known Allergies    Intolerances        HOME MEDICATIONS:    REVIEW OF SYSTEMS:  Constitutional:   Eyes:  ENT:  CV:  Resp:  GI:  :  MSK:  Integumentary:  Neurological:  Psychiatric:  Endocrine:  Hematologic/Lymphatic:  Allergic/Immunologic:  [ ] All other systems negative  [ ] Unable to assess ROS because ________    OBJECTIVE:  ICU Vital Signs Last 24 Hrs  T(C): 36.5 (18 Aug 2020 00:34), Max: 36.7 (17 Aug 2020 21:04)  T(F): 97.7 (18 Aug 2020 00:34), Max: 98 (17 Aug 2020 21:04)  HR: 89 (18 Aug 2020 03:01) (89 - 126)  BP: 122/62 (18 Aug 2020 03:01) (105/53 - 148/74)  BP(mean): --  ABP: --  ABP(mean): --  RR: 23 (18 Aug 2020 03:01) (12 - 23)  SpO2: 99% (18 Aug 2020 03:01) (97% - 100%)        CAPILLARY BLOOD GLUCOSE          PHYSICAL EXAM:  General:   HEENT:   Lymph Nodes:  Neck:   Respiratory:   Cardiovascular:   Abdomen:   Extremities:   Skin:   Neurological:  Psychiatry:    HOSPITAL MEDICATIONS:  MEDICATIONS  (STANDING):  potassium chloride  10 mEq/100 mL IVPB 10 milliEquivalent(s) IV Intermittent every 1 hour    MEDICATIONS  (PRN):      LABS:                        11.4   19.43 )-----------( 280      ( 18 Aug 2020 02:30 )             34.3     08-18    142  |  106  |  9   ----------------------------<  147<H>  3.1<L>   |  21<L>  |  1.04    Ca    8.1<L>      18 Aug 2020 00:59  Phos  1.9       Mg     1.6         TPro  7.8  /  Alb  4.5  /  TBili  3.6<H>  /  DBili  x   /  AST  24  /  ALT  12  /  AlkPhos  54  -      Urinalysis Basic - ( 18 Aug 2020 02:26 )    Color: Colorless / Appearance: Clear / S.014 / pH: x  Gluc: x / Ketone: Large  / Bili: Negative / Urobili: Negative   Blood: x / Protein: Negative / Nitrite: Negative   Leuk Esterase: Negative / RBC: 2 /hpf / WBC 0 /HPF   Sq Epi: x / Non Sq Epi: 1 /hpf / Bacteria: Negative        Venous Blood Gas:   @ 02:27  7.37/32/34/18/59  VBG Lactate: 2.9      MICROBIOLOGY:     RADIOLOGY:  [ ] Reviewed and interpreted by me    EKG:

## 2020-08-19 DIAGNOSIS — F33.1 MAJOR DEPRESSIVE DISORDER, RECURRENT, MODERATE: ICD-10-CM

## 2020-08-19 LAB
ALBUMIN SERPL ELPH-MCNC: 4.1 G/DL — SIGNIFICANT CHANGE UP (ref 3.3–5)
ALP SERPL-CCNC: 50 U/L — SIGNIFICANT CHANGE UP (ref 40–120)
ALT FLD-CCNC: 11 U/L — SIGNIFICANT CHANGE UP (ref 10–45)
ANION GAP SERPL CALC-SCNC: 11 MMOL/L — SIGNIFICANT CHANGE UP (ref 5–17)
AST SERPL-CCNC: 13 U/L — SIGNIFICANT CHANGE UP (ref 10–40)
BASE EXCESS BLDV CALC-SCNC: -1.7 MMOL/L — SIGNIFICANT CHANGE UP (ref -2–2)
BASOPHILS # BLD AUTO: 0.08 K/UL — SIGNIFICANT CHANGE UP (ref 0–0.2)
BASOPHILS NFR BLD AUTO: 0.9 % — SIGNIFICANT CHANGE UP (ref 0–2)
BILIRUB SERPL-MCNC: 0.4 MG/DL — SIGNIFICANT CHANGE UP (ref 0.2–1.2)
BUN SERPL-MCNC: 6 MG/DL — LOW (ref 7–23)
CA-I SERPL-SCNC: 1.22 MMOL/L — SIGNIFICANT CHANGE UP (ref 1.12–1.3)
CALCIUM SERPL-MCNC: 9.3 MG/DL — SIGNIFICANT CHANGE UP (ref 8.4–10.5)
CHLORIDE BLDV-SCNC: 113 MMOL/L — HIGH (ref 96–108)
CHLORIDE SERPL-SCNC: 108 MMOL/L — SIGNIFICANT CHANGE UP (ref 96–108)
CO2 BLDV-SCNC: 25 MMOL/L — SIGNIFICANT CHANGE UP (ref 22–30)
CO2 SERPL-SCNC: 22 MMOL/L — SIGNIFICANT CHANGE UP (ref 22–31)
CREAT SERPL-MCNC: 0.68 MG/DL — SIGNIFICANT CHANGE UP (ref 0.5–1.3)
EOSINOPHIL # BLD AUTO: 0.04 K/UL — SIGNIFICANT CHANGE UP (ref 0–0.5)
EOSINOPHIL NFR BLD AUTO: 0.4 % — SIGNIFICANT CHANGE UP (ref 0–6)
GAS PNL BLDV: 138 MMOL/L — SIGNIFICANT CHANGE UP (ref 135–145)
GAS PNL BLDV: SIGNIFICANT CHANGE UP
GAS PNL BLDV: SIGNIFICANT CHANGE UP
GLUCOSE BLDC GLUCOMTR-MCNC: 94 MG/DL — SIGNIFICANT CHANGE UP (ref 70–99)
GLUCOSE BLDC GLUCOMTR-MCNC: 95 MG/DL — SIGNIFICANT CHANGE UP (ref 70–99)
GLUCOSE BLDV-MCNC: 97 MG/DL — SIGNIFICANT CHANGE UP (ref 70–99)
GLUCOSE SERPL-MCNC: 100 MG/DL — HIGH (ref 70–99)
HCO3 BLDV-SCNC: 23 MMOL/L — SIGNIFICANT CHANGE UP (ref 21–29)
HCT VFR BLD CALC: 36.7 % — SIGNIFICANT CHANGE UP (ref 34.5–45)
HCT VFR BLDA CALC: 38 % — LOW (ref 39–50)
HGB BLD CALC-MCNC: 12.3 G/DL — SIGNIFICANT CHANGE UP (ref 11.5–15.5)
HGB BLD-MCNC: 11.8 G/DL — SIGNIFICANT CHANGE UP (ref 11.5–15.5)
IMM GRANULOCYTES NFR BLD AUTO: 0.3 % — SIGNIFICANT CHANGE UP (ref 0–1.5)
LACTATE BLDV-MCNC: 1.1 MMOL/L — SIGNIFICANT CHANGE UP (ref 0.7–2)
LYMPHOCYTES # BLD AUTO: 2.63 K/UL — SIGNIFICANT CHANGE UP (ref 1–3.3)
LYMPHOCYTES # BLD AUTO: 29.6 % — SIGNIFICANT CHANGE UP (ref 13–44)
MAGNESIUM SERPL-MCNC: 2.2 MG/DL — SIGNIFICANT CHANGE UP (ref 1.6–2.6)
MCHC RBC-ENTMCNC: 30.3 PG — SIGNIFICANT CHANGE UP (ref 27–34)
MCHC RBC-ENTMCNC: 32.2 GM/DL — SIGNIFICANT CHANGE UP (ref 32–36)
MCV RBC AUTO: 94.3 FL — SIGNIFICANT CHANGE UP (ref 80–100)
MONOCYTES # BLD AUTO: 0.93 K/UL — HIGH (ref 0–0.9)
MONOCYTES NFR BLD AUTO: 10.5 % — SIGNIFICANT CHANGE UP (ref 2–14)
NEUTROPHILS # BLD AUTO: 5.18 K/UL — SIGNIFICANT CHANGE UP (ref 1.8–7.4)
NEUTROPHILS NFR BLD AUTO: 58.3 % — SIGNIFICANT CHANGE UP (ref 43–77)
NRBC # BLD: 0 /100 WBCS — SIGNIFICANT CHANGE UP (ref 0–0)
OTHER CELLS CSF MANUAL: 10 ML/DL — LOW (ref 16–22)
PCO2 BLDV: 43 MMHG — SIGNIFICANT CHANGE UP (ref 35–50)
PH BLDV: 7.35 — SIGNIFICANT CHANGE UP (ref 7.35–7.45)
PHOSPHATE SERPL-MCNC: 2.6 MG/DL — SIGNIFICANT CHANGE UP (ref 2.5–4.5)
PLATELET # BLD AUTO: 299 K/UL — SIGNIFICANT CHANGE UP (ref 150–400)
PO2 BLDV: 34 MMHG — SIGNIFICANT CHANGE UP (ref 25–45)
POTASSIUM BLDV-SCNC: 4.1 MMOL/L — SIGNIFICANT CHANGE UP (ref 3.5–5.3)
POTASSIUM SERPL-MCNC: 4.2 MMOL/L — SIGNIFICANT CHANGE UP (ref 3.5–5.3)
POTASSIUM SERPL-SCNC: 4.2 MMOL/L — SIGNIFICANT CHANGE UP (ref 3.5–5.3)
PROT SERPL-MCNC: 7.2 G/DL — SIGNIFICANT CHANGE UP (ref 6–8.3)
RBC # BLD: 3.89 M/UL — SIGNIFICANT CHANGE UP (ref 3.8–5.2)
RBC # FLD: 13.7 % — SIGNIFICANT CHANGE UP (ref 10.3–14.5)
SAO2 % BLDV: 58 % — LOW (ref 67–88)
SODIUM SERPL-SCNC: 141 MMOL/L — SIGNIFICANT CHANGE UP (ref 135–145)
WBC # BLD: 8.89 K/UL — SIGNIFICANT CHANGE UP (ref 3.8–10.5)
WBC # FLD AUTO: 8.89 K/UL — SIGNIFICANT CHANGE UP (ref 3.8–10.5)

## 2020-08-19 PROCEDURE — 99232 SBSQ HOSP IP/OBS MODERATE 35: CPT

## 2020-08-19 RX ORDER — ONDANSETRON 8 MG/1
4 TABLET, FILM COATED ORAL EVERY 8 HOURS
Refills: 0 | Status: DISCONTINUED | OUTPATIENT
Start: 2020-08-19 | End: 2020-08-20

## 2020-08-19 RX ADMIN — Medication 0.5 MILLIGRAM(S): at 21:20

## 2020-08-19 RX ADMIN — ENOXAPARIN SODIUM 40 MILLIGRAM(S): 100 INJECTION SUBCUTANEOUS at 11:12

## 2020-08-19 RX ADMIN — SODIUM CHLORIDE 75 MILLILITER(S): 9 INJECTION, SOLUTION INTRAVENOUS at 00:04

## 2020-08-19 RX ADMIN — ONDANSETRON 4 MILLIGRAM(S): 8 TABLET, FILM COATED ORAL at 13:01

## 2020-08-19 NOTE — DISCHARGE NOTE NURSING/CASE MANAGEMENT/SOCIAL WORK - PATIENT PORTAL LINK FT
You can access the FollowMyHealth Patient Portal offered by [facility] by registering at the following website: [email or web address]. By joining [facility]’s FollowMyHealth portal, you will also be able to view your health information using other applications (apps) compatible with our system.

## 2020-08-19 NOTE — PROGRESS NOTE BEHAVIORAL HEALTH - SUMMARY
Pt is a 20yo single woman, domiciled with family, pphx of depression and prior drug OD suicide attempt in 2017, no prior psychiatric hospital admissions. Pt was brought to [facility] ED, transferred to NSU and admitted to ICU for intentional Naproxen overdose at attempted suicide. Psychiatry consult for possible inpatient psychiatry admission.  Patient presents A&Ox4, with some levity regarding suicide attempt, and says she does not regret having made this attempt.  Patient states she felt a sudden urge of "feeling done" and wanting to end her life when she got angry at her brother and parents. She states she took~70 250mg Naproxen tablets with the intent to end her life.  She states her depression has worsened since being home because of COVID and she gets random urges to kill herself and intrusive thoughts on why she should do it.  Patient admits to feeling depressed with intrusive negative thoughts but lacks appropriateness to context. She denies current anxiety, vidhi, paranoia or hallucinations,  no current SI, and no past history of homicidal ideations. Pt will be transferred to psychiatry once she is medically stable. Pt is a 18yo single woman, domiciled with family, pphx of depression and prior drug OD suicide attempt in 2017, no prior psychiatric hospital admissions. Pt was brought to [facility] ED, transferred to NSU and admitted to ICU for intentional Naproxen overdose at attempted suicide. Psychiatry consult for possible inpatient psychiatry admission.  Patient presents A&Ox4, with some levity regarding suicide attempt, and says she does not regret having made this attempt.  Patient states she felt a sudden urge of "feeling done" and wanting to end her life when she got angry at her brother and parents. She states she took~70 250mg Naproxen tablets with the intent to end her life.  She states her depression has worsened since being home because of COVID and she gets random urges to kill herself and intrusive thoughts on why she should do it.  Patient admits to feeling depressed with intrusive negative thoughts but lacks appropriateness to context. She denies current anxiety, vidhi, paranoia or hallucinations,  no current SI, and no past history of homicidal ideations. Pt will be transferred to psychiatry today.  Prn Ativan ordered 0.5mgpo q3hrs prn Pt is a 18yo single woman, domiciled with family, pphx of depression and prior drug OD suicide attempt in 2017, no prior psychiatric hospital admissions. Pt was brought to [facility] ED, transferred to [facility]U and admitted to ICU for intentional Naproxen overdose at attempted suicide. Psychiatry consult for possible inpatient psychiatry admission.  Patient presents A&Ox4, with some levity regarding suicide attempt, and says she does not regret having made this attempt.  Patient states she felt a sudden urge of "feeling done" and wanting to end her life when she got angry at her brother and parents. She states she took~70 250mg Naproxen tablets with the intent to end her life.  She states her depression has worsened since being home because of COVID and she gets random urges to kill herself and intrusive thoughts on why she should do it.  Patient admits to feeling depressed with intrusive negative thoughts but lacks appropriateness to context. She denies current anxiety, vidhi, paranoia or hallucinations,  no current SI, and no past history of homicidal ideations. Pt will be transferred to psychiatry today. Gave verbal sign off to Dr Barbara Diaz on 1N at [facility].   Prn Ativan ordered 0.5mgpo q3hrs prn

## 2020-08-19 NOTE — PROGRESS NOTE BEHAVIORAL HEALTH - NSBHFUPINTERVALHXFT_PSY_A_CORE
PT and her mother at her bedside say that she has been unable to eat much.  Pt's mother says that she eats "like a toddler" and would not eat hospital food  so she will bring her daughter some food from downstairs.  PT denies current SI and says she just wants to go home. Pt is reluctant to go to psychiatry but understands that she needs more psychiatric attention prior to going home because of the serious nature of her overdose.  Discussed plans for transfer with pt and her mothers. PT denies current SI and says she just wants to go home.

## 2020-08-19 NOTE — PHARMACOTHERAPY INTERVENTION NOTE - COMMENTS
Medication reconciliation completed. All medications confirmed with patient and pharmacy. Please refer to outpatient medication review for the updated list.    Home Medications:  Ortho Tri-Cyclen oral tablet: 1 tab(s) orally once a day     Selin Ochoa, PharmD  PGY-1 Pharmacy Resident  [phone]

## 2020-08-19 NOTE — CHART NOTE - NSCHARTNOTEFT_GEN_A_CORE
MEDICINE NP     MARTITA NASRIN  19y Female  Patient is a 19y old  Female who presents with a chief complaint of OD (19 Aug 2020 13:39)       Event Summary:  Patient seen at bedside due to her mother calling stating her daughter wants to come home   she doesn't want to stay in the hospital due to the noise and the machines, feels very anxious.    Review psychiatry's  note with the mother - Patient has attempted SI twice and does not have any remorse   Patient will be referred to inpatient psychiatric facility. She wants to take patient home. As explained to her   if she attempts a third time she just might succeed, so please allow out psychiatry team to treat patient   appropriately, they are the experts and will do the right thing for your daughter.  She ask if we could give  patient something for anxiety     Patient seen at bedside, with 1:1 observation, patient states she feels a little anxiety, she wants to go home   doesn't want to stay here, States she is unable to relax. she feels "hot" and pulling at her gown.   Call to Dr. Tracy to ask for something to assist patient in decreasing her anxiety, we agreed to give  Ativan 0.5 mg po x 1.  Discussed with night RN the plan to given Ativan 0.5 mg po x 1          Vital Signs Last 24 Hrs  T(C): 36.8 (19 Aug 2020 06:05), Max: 36.9 (18 Aug 2020 23:55)  T(F): 98.2 (19 Aug 2020 06:05), Max: 98.5 (18 Aug 2020 23:55)  HR: 62 (19 Aug 2020 06:05) (62 - 100)  BP: 111/74 (19 Aug 2020 06:05) (110/66 - 119/77)  BP(mean): 82 (18 Aug 2020 21:00) (82 - 82)  RR: 18 (19 Aug 2020 06:05) (15 - 18)  SpO2: 98% (19 Aug 2020 06:05) (97% - 99%)    will continue to monitor   Report given to oncoming team       Katia Rose, SAMMY-C  Medicine Department

## 2020-08-19 NOTE — PROGRESS NOTE BEHAVIORAL HEALTH - NSBHCHARTREVIEWLAB_PSY_A_CORE FT
11.4   19.43 )-----------( 280      ( 18 Aug 2020 02:30 )             34.3 12.0   8.27  )-----------( 310      ( 20 Aug 2020 06:21 )             36.5   08-20    138  |  103  |  5<L>  ----------------------------<  108<H>  3.7   |  21<L>  |  0.73    Ca    9.7      20 Aug 2020 06:21  Phos  2.6     08-19  Mg     2.2     08-19    TPro  7.2  /  Alb  4.1  /  TBili  0.4  /  DBili  x   /  AST  13  /  ALT  11  /  AlkPhos  50  08-19

## 2020-08-19 NOTE — PROGRESS NOTE BEHAVIORAL HEALTH - NSBHCHARTREVIEWVS_PSY_A_CORE FT
Vital Signs Last 24 Hrs  T(C): 36.8 (19 Aug 2020 06:05), Max: 36.9 (18 Aug 2020 23:55)  T(F): 98.2 (19 Aug 2020 06:05), Max: 98.5 (18 Aug 2020 23:55)  HR: 62 (19 Aug 2020 06:05) (62 - 100)  BP: 111/74 (19 Aug 2020 06:05) (99/57 - 119/77)  BP(mean): 82 (18 Aug 2020 21:00) (74 - 89)  RR: 18 (19 Aug 2020 06:05) (15 - 24)  SpO2: 98% (19 Aug 2020 06:05) (97% - 99%) Vital Signs Last 24 Hrs  T(C): 37.1 (20 Aug 2020 14:37), Max: 37.1 (20 Aug 2020 14:37)  T(F): 98.7 (20 Aug 2020 14:37), Max: 98.7 (20 Aug 2020 14:37)  HR: 77 (20 Aug 2020 14:37) (72 - 78)  BP: 121/78 (20 Aug 2020 14:37) (120/70 - 122/75)  BP(mean): --  RR: 18 (20 Aug 2020 14:37) (17 - 18)  SpO2: 98% (20 Aug 2020 14:37) (98% - 100%)

## 2020-08-20 ENCOUNTER — INPATIENT (INPATIENT)
Facility: HOSPITAL | Age: 20
LOS: 5 days | Discharge: HOME | End: 2020-08-26
Attending: PSYCHIATRY & NEUROLOGY | Admitting: PSYCHIATRY & NEUROLOGY
Payer: COMMERCIAL

## 2020-08-20 VITALS
TEMPERATURE: 99 F | OXYGEN SATURATION: 99 % | DIASTOLIC BLOOD PRESSURE: 87 MMHG | RESPIRATION RATE: 18 BRPM | HEART RATE: 91 BPM | SYSTOLIC BLOOD PRESSURE: 130 MMHG

## 2020-08-20 VITALS — WEIGHT: 125 LBS | HEIGHT: 67 IN | TEMPERATURE: 98 F

## 2020-08-20 DIAGNOSIS — F32.9 MAJOR DEPRESSIVE DISORDER, SINGLE EPISODE, UNSPECIFIED: ICD-10-CM

## 2020-08-20 PROBLEM — T14.91XA SUICIDE ATTEMPT, INITIAL ENCOUNTER: Chronic | Status: ACTIVE | Noted: 2020-08-18

## 2020-08-20 LAB
ANION GAP SERPL CALC-SCNC: 14 MMOL/L — SIGNIFICANT CHANGE UP (ref 5–17)
BUN SERPL-MCNC: 5 MG/DL — LOW (ref 7–23)
CALCIUM SERPL-MCNC: 9.7 MG/DL — SIGNIFICANT CHANGE UP (ref 8.4–10.5)
CHLORIDE SERPL-SCNC: 103 MMOL/L — SIGNIFICANT CHANGE UP (ref 96–108)
CO2 SERPL-SCNC: 21 MMOL/L — LOW (ref 22–31)
CREAT SERPL-MCNC: 0.73 MG/DL — SIGNIFICANT CHANGE UP (ref 0.5–1.3)
GLUCOSE SERPL-MCNC: 108 MG/DL — HIGH (ref 70–99)
HCT VFR BLD CALC: 36.5 % — SIGNIFICANT CHANGE UP (ref 34.5–45)
HGB BLD-MCNC: 12 G/DL — SIGNIFICANT CHANGE UP (ref 11.5–15.5)
MCHC RBC-ENTMCNC: 30.4 PG — SIGNIFICANT CHANGE UP (ref 27–34)
MCHC RBC-ENTMCNC: 32.9 GM/DL — SIGNIFICANT CHANGE UP (ref 32–36)
MCV RBC AUTO: 92.4 FL — SIGNIFICANT CHANGE UP (ref 80–100)
NRBC # BLD: 0 /100 WBCS — SIGNIFICANT CHANGE UP (ref 0–0)
PLATELET # BLD AUTO: 310 K/UL — SIGNIFICANT CHANGE UP (ref 150–400)
POTASSIUM SERPL-MCNC: 3.7 MMOL/L — SIGNIFICANT CHANGE UP (ref 3.5–5.3)
POTASSIUM SERPL-SCNC: 3.7 MMOL/L — SIGNIFICANT CHANGE UP (ref 3.5–5.3)
RBC # BLD: 3.95 M/UL — SIGNIFICANT CHANGE UP (ref 3.8–5.2)
RBC # FLD: 13.5 % — SIGNIFICANT CHANGE UP (ref 10.3–14.5)
SODIUM SERPL-SCNC: 138 MMOL/L — SIGNIFICANT CHANGE UP (ref 135–145)
WBC # BLD: 8.27 K/UL — SIGNIFICANT CHANGE UP (ref 3.8–10.5)
WBC # FLD AUTO: 8.27 K/UL — SIGNIFICANT CHANGE UP (ref 3.8–10.5)

## 2020-08-20 PROCEDURE — 80307 DRUG TEST PRSMV CHEM ANLYZR: CPT

## 2020-08-20 PROCEDURE — 87635 SARS-COV-2 COVID-19 AMP PRB: CPT

## 2020-08-20 PROCEDURE — 83605 ASSAY OF LACTIC ACID: CPT

## 2020-08-20 PROCEDURE — 93005 ELECTROCARDIOGRAM TRACING: CPT

## 2020-08-20 PROCEDURE — 81025 URINE PREGNANCY TEST: CPT

## 2020-08-20 PROCEDURE — 81003 URINALYSIS AUTO W/O SCOPE: CPT

## 2020-08-20 PROCEDURE — 82435 ASSAY OF BLOOD CHLORIDE: CPT

## 2020-08-20 PROCEDURE — 81001 URINALYSIS AUTO W/SCOPE: CPT

## 2020-08-20 PROCEDURE — 85014 HEMATOCRIT: CPT

## 2020-08-20 PROCEDURE — 80048 BASIC METABOLIC PNL TOTAL CA: CPT

## 2020-08-20 PROCEDURE — 84132 ASSAY OF SERUM POTASSIUM: CPT

## 2020-08-20 PROCEDURE — 96376 TX/PRO/DX INJ SAME DRUG ADON: CPT

## 2020-08-20 PROCEDURE — 96361 HYDRATE IV INFUSION ADD-ON: CPT

## 2020-08-20 PROCEDURE — 82962 GLUCOSE BLOOD TEST: CPT

## 2020-08-20 PROCEDURE — 80053 COMPREHEN METABOLIC PANEL: CPT

## 2020-08-20 PROCEDURE — 82550 ASSAY OF CK (CPK): CPT

## 2020-08-20 PROCEDURE — 84100 ASSAY OF PHOSPHORUS: CPT

## 2020-08-20 PROCEDURE — 96375 TX/PRO/DX INJ NEW DRUG ADDON: CPT

## 2020-08-20 PROCEDURE — 96365 THER/PROPH/DIAG IV INF INIT: CPT

## 2020-08-20 PROCEDURE — 93010 ELECTROCARDIOGRAM REPORT: CPT

## 2020-08-20 PROCEDURE — 99291 CRITICAL CARE FIRST HOUR: CPT | Mod: 25

## 2020-08-20 PROCEDURE — 83735 ASSAY OF MAGNESIUM: CPT

## 2020-08-20 PROCEDURE — 82803 BLOOD GASES ANY COMBINATION: CPT

## 2020-08-20 PROCEDURE — 36415 COLL VENOUS BLD VENIPUNCTURE: CPT

## 2020-08-20 PROCEDURE — 84702 CHORIONIC GONADOTROPIN TEST: CPT

## 2020-08-20 PROCEDURE — 99232 SBSQ HOSP IP/OBS MODERATE 35: CPT

## 2020-08-20 PROCEDURE — 84295 ASSAY OF SERUM SODIUM: CPT

## 2020-08-20 PROCEDURE — 82565 ASSAY OF CREATININE: CPT

## 2020-08-20 PROCEDURE — 84443 ASSAY THYROID STIM HORMONE: CPT

## 2020-08-20 PROCEDURE — 82947 ASSAY GLUCOSE BLOOD QUANT: CPT

## 2020-08-20 PROCEDURE — 86769 SARS-COV-2 COVID-19 ANTIBODY: CPT

## 2020-08-20 PROCEDURE — 70450 CT HEAD/BRAIN W/O DYE: CPT

## 2020-08-20 PROCEDURE — 82330 ASSAY OF CALCIUM: CPT

## 2020-08-20 PROCEDURE — 87150 DNA/RNA AMPLIFIED PROBE: CPT

## 2020-08-20 PROCEDURE — 87040 BLOOD CULTURE FOR BACTERIA: CPT

## 2020-08-20 PROCEDURE — 99285 EMERGENCY DEPT VISIT HI MDM: CPT

## 2020-08-20 PROCEDURE — 85027 COMPLETE CBC AUTOMATED: CPT

## 2020-08-20 RX ORDER — LANOLIN ALCOHOL/MO/W.PET/CERES
3 CREAM (GRAM) TOPICAL AT BEDTIME
Refills: 0 | Status: DISCONTINUED | OUTPATIENT
Start: 2020-08-20 | End: 2020-08-26

## 2020-08-20 RX ORDER — ONDANSETRON 8 MG/1
4 TABLET, FILM COATED ORAL
Refills: 0 | Status: DISCONTINUED | OUTPATIENT
Start: 2020-08-20 | End: 2020-08-20

## 2020-08-20 RX ORDER — ONDANSETRON 8 MG/1
4 TABLET, FILM COATED ORAL
Refills: 0 | Status: DISCONTINUED | OUTPATIENT
Start: 2020-08-20 | End: 2020-08-26

## 2020-08-20 RX ORDER — NORGESTIMATE AND ETHINYL ESTRADIOL 7DAYSX3 LO
1 KIT ORAL
Qty: 0 | Refills: 0 | DISCHARGE

## 2020-08-20 RX ORDER — HYDROXYZINE HCL 10 MG
25 TABLET ORAL
Refills: 0 | Status: DISCONTINUED | OUTPATIENT
Start: 2020-08-20 | End: 2020-08-26

## 2020-08-20 RX ADMIN — Medication 3 MILLIGRAM(S): at 23:20

## 2020-08-20 RX ADMIN — Medication 0.5 MILLIGRAM(S): at 17:57

## 2020-08-20 RX ADMIN — ONDANSETRON 4 MILLIGRAM(S): 8 TABLET, FILM COATED ORAL at 23:20

## 2020-08-20 RX ADMIN — SODIUM CHLORIDE 75 MILLILITER(S): 9 INJECTION, SOLUTION INTRAVENOUS at 05:47

## 2020-08-20 RX ADMIN — ENOXAPARIN SODIUM 40 MILLIGRAM(S): 100 INJECTION SUBCUTANEOUS at 11:20

## 2020-08-20 NOTE — PROGRESS NOTE ADULT - ASSESSMENT
18 yo F with hx of drug overdose and depression presents as OSH for naproxen overdose, concerned for suicide attempt, transferred to [facility].    # Naproxen Overdose, NSAID toxicity  # Depression  # Suicide attempt  -monitor for seizures, q4hr neuro checks  -toxicology consult  -psychiatry following  -one to one   -EKG Qtc 508  -leukocytosis resolved  -2nd attempt with naproxen Overdose  -inpatient psych transfer once medically stable
18 yo F with hx of drug overdose and depression presents as OSH for naproxen overdose, concerned for suicide attempt, transferred to [facility].    # Naproxen Overdose, NSAID toxicity  # Depression  # Suicide attempt  -appreciate toxicology recs  -medically stable  -1:1  -fu psych recs re inpatient transfer

## 2020-08-20 NOTE — PROGRESS NOTE BEHAVIORAL HEALTH - NSBHCHARTREVIEWVS_PSY_A_CORE FT
Vital Signs Last 24 Hrs  T(C): 36.8 (19 Aug 2020 06:05), Max: 36.9 (18 Aug 2020 23:55)  T(F): 98.2 (19 Aug 2020 06:05), Max: 98.5 (18 Aug 2020 23:55)  HR: 62 (19 Aug 2020 06:05) (62 - 100)  BP: 111/74 (19 Aug 2020 06:05) (99/57 - 119/77)  BP(mean): 82 (18 Aug 2020 21:00) (74 - 89)  RR: 18 (19 Aug 2020 06:05) (15 - 24)  SpO2: 98% (19 Aug 2020 06:05) (97% - 99%)

## 2020-08-20 NOTE — PROGRESS NOTE ADULT - SUBJECTIVE AND OBJECTIVE BOX
Patient is a 19y old  Female who presents with a chief complaint of     SUBJECTIVE / OVERNIGHT EVENTS:    Patient seen and examined. does not currently feel SI or HI. states she was more depressed 2/2 COVID and social distancing. had poor appetite at home. not eating much.  co abd pain earlier this morning with some burning but now resolved.      Vital Signs Last 24 Hrs  T(C): 36.8 (19 Aug 2020 06:05), Max: 36.9 (18 Aug 2020 23:55)  T(F): 98.2 (19 Aug 2020 06:05), Max: 98.5 (18 Aug 2020 23:55)  HR: 62 (19 Aug 2020 06:05) (62 - 116)  BP: 111/74 (19 Aug 2020 06:05) (99/57 - 119/77)  BP(mean): 82 (18 Aug 2020 21:00) (74 - 89)  RR: 18 (19 Aug 2020 06:05) (14 - 24)  SpO2: 98% (19 Aug 2020 06:05) (97% - 99%)  I&O's Summary    18 Aug 2020 07:01  -  19 Aug 2020 07:00  --------------------------------------------------------  IN: 2165 mL / OUT: 400 mL / NET: 1765 mL        PE:  GENERAL: NAD, AAOx3  HEAD:  Atraumatic, Normocephalic  CHEST/LUNG: CTABL, No wheeze  HEART: Regular rate and rhythm; no murmur  ABDOMEN: Soft, Nontender, Nondistended; Bowel sounds present  EXTREMITIES:  2+ Peripheral Pulses, No clubbing, cyanosis, or edema  SKIN: No rashes or lesions  NEURO: No focal deficits    LABS:                        11.8   8.89  )-----------( 299      ( 19 Aug 2020 06:39 )             36.7     08-19    141  |  108  |  6<L>  ----------------------------<  100<H>  4.2   |  22  |  0.68    Ca    9.3      19 Aug 2020 06:38  Phos  2.6     08-19  Mg     2.2     08-19    TPro  7.2  /  Alb  4.1  /  TBili  0.4  /  DBili  x   /  AST  13  /  ALT  11  /  AlkPhos  50        CAPILLARY BLOOD GLUCOSE      POCT Blood Glucose.: 94 mg/dL (19 Aug 2020 06:02)  POCT Blood Glucose.: 95 mg/dL (19 Aug 2020 00:13)    CARDIAC MARKERS ( 18 Aug 2020 02:30 )  x     / x     / 148 U/L / x     / x          Urinalysis Basic - ( 18 Aug 2020 06:28 )    Color: Light Yellow / Appearance: Clear / S.016 / pH: x  Gluc: x / Ketone: Large  / Bili: Negative / Urobili: Negative   Blood: x / Protein: Negative / Nitrite: Negative   Leuk Esterase: Negative / RBC: 1 /hpf / WBC 2 /HPF   Sq Epi: x / Non Sq Epi: 1 /hpf / Bacteria: Negative        RADIOLOGY & ADDITIONAL TESTS:    Imaging Personally Reviewed:  [x] YES  [ ] NO    Consultant(s) Notes Reviewed:  [x] YES  [ ] NO    MEDICATIONS  (STANDING):  dextrose 5% + lactated ringers. 1000 milliLiter(s) (75 mL/Hr) IV Continuous <Continuous>  enoxaparin Injectable 40 milliGRAM(s) SubCutaneous daily    MEDICATIONS  (PRN):  ondansetron Injectable 4 milliGRAM(s) IV Push every 8 hours PRN Nausea and/or Vomiting      Care Discussed with Consultants/Other Providers [x] YES  [ ] NO    HEALTH ISSUES - PROBLEM Dx:
Patient is a 19y old  Female who presents with a chief complaint of OD (19 Aug 2020 13:39)      SUBJECTIVE / OVERNIGHT EVENTS:    Patient seen and examined. pt states last night she experience extreme anxiousness and felt panic attack but ativan helped calm her and put her to sleep. pt states she hates her hospitalization and does not ever want to be hospitalized again. she feels she will never do this again to avoid the hospital. pt is willing to follow psych and therapist outpt. pt states hospital makes her anxious and she wants to be home with family.      Vital Signs Last 24 Hrs  T(C): 36.8 (20 Aug 2020 05:46), Max: 36.8 (20 Aug 2020 05:46)  T(F): 98.3 (20 Aug 2020 05:46), Max: 98.3 (20 Aug 2020 05:46)  HR: 78 (20 Aug 2020 05:46) (72 - 78)  BP: 122/75 (20 Aug 2020 05:46) (120/70 - 122/75)  BP(mean): --  RR: 17 (20 Aug 2020 05:46) (17 - 17)  SpO2: 100% (20 Aug 2020 05:46) (99% - 100%)  I&O's Summary    19 Aug 2020 07:01  -  20 Aug 2020 07:00  --------------------------------------------------------  IN: 1370 mL / OUT: 0 mL / NET: 1370 mL        PE:  GENERAL: NAD, AAOx3  HEAD:  Atraumatic, Normocephalic  CHEST/LUNG: CTABL, No wheeze  HEART: Regular rate and rhythm; no murmur  ABDOMEN: Soft, Nontender, Nondistended; Bowel sounds present  EXTREMITIES:  2+ Peripheral Pulses, No clubbing, cyanosis, or edema  SKIN: No rashes or lesions  NEURO: No focal deficits    LABS:                        12.0   8.27  )-----------( 310      ( 20 Aug 2020 06:21 )             36.5     08-20    138  |  103  |  5<L>  ----------------------------<  108<H>  3.7   |  21<L>  |  0.73    Ca    9.7      20 Aug 2020 06:21  Phos  2.6     08-19  Mg     2.2     08-19    TPro  7.2  /  Alb  4.1  /  TBili  0.4  /  DBili  x   /  AST  13  /  ALT  11  /  AlkPhos  50  08-19      CAPILLARY BLOOD GLUCOSE                RADIOLOGY & ADDITIONAL TESTS:    Imaging Personally Reviewed:  [x] YES  [ ] NO    Consultant(s) Notes Reviewed:  [x] YES  [ ] NO    MEDICATIONS  (STANDING):  dextrose 5% + lactated ringers. 1000 milliLiter(s) (75 mL/Hr) IV Continuous <Continuous>  enoxaparin Injectable 40 milliGRAM(s) SubCutaneous daily    MEDICATIONS  (PRN):  ondansetron Injectable 4 milliGRAM(s) IV Push every 8 hours PRN Nausea and/or Vomiting      Care Discussed with Consultants/Other Providers [x] YES  [ ] NO    HEALTH ISSUES - PROBLEM Dx:  Moderate episode of recurrent major depressive disorder

## 2020-08-20 NOTE — PROGRESS NOTE BEHAVIORAL HEALTH - SUMMARY
Pt is a 18yo single woman, domiciled with family, pphx of depression and prior drug OD suicide attempt in 2017, no prior psychiatric hospital admissions. Pt was brought to [facility] ED, transferred to NSU and admitted to ICU for intentional Naproxen overdose at attempted suicide. Psychiatry consult for possible inpatient psychiatry admission.  Patient presents A&Ox4, with some levity regarding suicide attempt, and says she does not regret having made this attempt.  Patient states she felt a sudden urge of "feeling done" and wanting to end her life when she got angry at her brother and parents. She states she took~70 250mg Naproxen tablets with the intent to end her life.  She states her depression has worsened since being home because of COVID and she gets random urges to kill herself and intrusive thoughts on why she should do it.  Patient admits to feeling depressed with intrusive negative thoughts but lacks appropriateness to context. She denies current anxiety, vidhi, paranoia or hallucinations,  no current SI, and no past history of homicidal ideations. Pt will be transferred to psychiatry once she is medically stable.

## 2020-08-20 NOTE — PROGRESS NOTE BEHAVIORAL HEALTH - NSBHCHARTREVIEWINVESTIGATE_PSY_A_CORE FT
< from: 12 Lead ECG (08.17.20 @ 21:57) >    Ventricular Rate 92 BPM    Atrial Rate 92 BPM    P-R Interval 136 ms    QRS Duration 82 ms    Q-T Interval 368 ms    QTC Calculation(Bezet) 455 ms    P Axis 71 degrees    R Axis 74 degrees    T Axis 44 degrees    Diagnosis Line Normal sinus rhythm with sinus arrhythmia  Incomplete right bundle branch block  No previous ECGs available  Confirmed by Palla MD, Demario ([id number]) on 8/18/2020 3:38:53 PM    < end of copied text >
< from: 12 Lead ECG (08.17.20 @ 21:57) >    Ventricular Rate 92 BPM    Atrial Rate 92 BPM    P-R Interval 136 ms    QRS Duration 82 ms    Q-T Interval 368 ms    QTC Calculation(Bezet) 455 ms    P Axis 71 degrees    R Axis 74 degrees    T Axis 44 degrees    Diagnosis Line Normal sinus rhythm with sinus arrhythmia  Incomplete right bundle branch block  No previous ECGs available  Confirmed by Palla MD, Demario ([id number]) on 8/18/2020 3:38:53 PM    < end of copied text >

## 2020-08-20 NOTE — PROGRESS NOTE BEHAVIORAL HEALTH - NSBHFUPREASONCONS_PSY_A_CORE
Department of Anesthesiology  Postprocedure Note    Patient: Maddy Pérez  MRN: [id number]  YOB: 1943  Date of evaluation: 8/1/2020  Time:  5:14 PM     Procedure Summary     Date:  07/31/20 Room / Location:  [address]    Anesthesia Start:  9899 Anesthesia Stop:  0283    Procedure:  BEDSIDE EGD ESOPHAGOGASTRODUODENOSCOPY (N/A ) Diagnosis:  (unk)    Surgeon:  Benji Fregoso MD Responsible Provider:  Lorna Zuluaga MD    Anesthesia Type:  MAC ASA Status:  4          Anesthesia Type: No value filed. Madhu Phase I:      Madhu Phase II:      Last vitals: Reviewed and per EMR flowsheets.        Anesthesia Post Evaluation    Patient location during evaluation: PACU  Patient participation: complete - patient participated  Level of consciousness: awake and alert  Airway patency: patent  Nausea & Vomiting: no vomiting and no nausea  Complications: no  Cardiovascular status: blood pressure returned to baseline  Respiratory status: acceptable  Hydration status: euvolemic
suicidality
suicidality

## 2020-08-20 NOTE — DISCHARGE NOTE PROVIDER - CARE PROVIDER_API CALL
Maritza Oseguera  INTERNAL MEDICINE  [address] [place] [address]  Phone: [phone]  Fax: [phone]  Follow Up Time:

## 2020-08-20 NOTE — CHART NOTE - NSCHARTNOTEFT_GEN_A_CORE
Request from Dr. Oseguera to facilitate patient discharge and transfer to inpatient psychiatric facility.  Medication reconciliation reviewed, revised, and resolved with Dr. Oseguera, who has medically cleared patient for discharge with follow up as advised.  Please refer to discharge note for detailed hospital course.

## 2020-08-20 NOTE — PROGRESS NOTE BEHAVIORAL HEALTH - NSBHFUPINTERVALHXFT_PSY_A_CORE
PT and her mother at her bedside say that she has been unable to eat much.  Pt's mother and pt agree that she will be transferred PT denies current SI and says she just wants to go home.

## 2020-08-20 NOTE — DISCHARGE NOTE PROVIDER - NSDCFUADDAPPT_GEN_ALL_CORE_FT
Transfer to inpatient psychiatric facility for further treatment.    Follow up with your primary medical doctor upon discharge.  If you do not have a PMD, you can call [phone] and they will give you a list of providers in your area for follow up.

## 2020-08-20 NOTE — DISCHARGE NOTE PROVIDER - NSDCCPCAREPLAN_GEN_ALL_CORE_FT
PRINCIPAL DISCHARGE DIAGNOSIS  Diagnosis: NSAID overdose, intentional self-harm, initial encounter  Assessment and Plan of Treatment: Stable for inpatient psychiatric admission for further treatment      SECONDARY DISCHARGE DIAGNOSES  Diagnosis: Hypokalemia  Assessment and Plan of Treatment: Resolved    Diagnosis: Nausea and vomiting  Assessment and Plan of Treatment: Improved    Diagnosis: AMS (altered mental status)  Assessment and Plan of Treatment: Resolved

## 2020-08-20 NOTE — DISCHARGE NOTE PROVIDER - HOSPITAL COURSE
19 year old female with history of drug overdose and depression, presents at OSH for naproxen overdose, concerned for suicide attempt, transferred to [facility] MICU for further monitoring for naproxen toxicity.  CT Head negative.  Psychiatry and Toxicology consulted.  Constant observation for safety. 19 year old female with history of drug overdose and depression, presents at OSH for naproxen overdose, concerned for suicide attempt, transferred to [facility] MICU for further monitoring for naproxen toxicity.  CT Head negative.  Psychiatry and Toxicology consulted.  Constant observation for safety.  Anion gap closed.  Medically cleared and stable for discharge to inpatient psychiatric facility for further treatment.

## 2020-08-20 NOTE — CHART NOTE - NSCHARTNOTEFT_GEN_A_CORE
The patient is a 19-year-old female who ingested 70 tablets of 250 mg naproxen 1.5 hours to presentation to the emergency department at [facility]. She had one episode of emesis. She was transferred to [facility] and excepted to the MICU. Her vital signs have remain normal. Her labs have normalized. Her anion gap has closed. Given the fact that the patient's mental status has normalized and that her labs are normal, the patient no longer has an acute toxicologic emergency.

## 2020-08-21 PROCEDURE — 99232 SBSQ HOSP IP/OBS MODERATE 35: CPT

## 2020-08-21 PROCEDURE — 99222 1ST HOSP IP/OBS MODERATE 55: CPT

## 2020-08-21 RX ORDER — BUPROPION HYDROCHLORIDE 150 MG/1
150 TABLET, EXTENDED RELEASE ORAL DAILY
Refills: 0 | Status: DISCONTINUED | OUTPATIENT
Start: 2020-08-21 | End: 2020-08-26

## 2020-08-21 RX ORDER — POLYETHYLENE GLYCOL 3350 17 G/17G
17 POWDER, FOR SOLUTION ORAL DAILY
Refills: 0 | Status: DISCONTINUED | OUTPATIENT
Start: 2020-08-21 | End: 2020-08-26

## 2020-08-21 RX ADMIN — Medication 25 MILLIGRAM(S): at 20:00

## 2020-08-21 RX ADMIN — BUPROPION HYDROCHLORIDE 150 MILLIGRAM(S): 150 TABLET, EXTENDED RELEASE ORAL at 12:09

## 2020-08-21 NOTE — CONSULT NOTE ADULT - SUBJECTIVE AND OBJECTIVE BOX
HPI: 18 yo F with history of depression and prior drug overdose (Motrin 2017) presented to [place] ED transferred to [facility] for naproxen overdose (~70 pills) s/p argument with parents now transferred to IP psych for continue management.     Allergies: No Known Allergies    MEDICATIONS  (STANDING):  buPROPion  milliGRAM(s) Oral daily    MEDICATIONS  (PRN):  hydrOXYzine hydrochloride 25 milliGRAM(s) Oral two times a day PRN anxiety  LORazepam  Tablet 2 milliGRAM(s) Oral every 6 hours PRN Agitation  LORazepam  Injectable 2 milliGRAM(s) IntraMuscular once PRN Severe agitation  melatonin 3 milliGRAM(s) Oral at bedtime PRN Insomnia  ondansetron    Tablet 4 milliGRAM(s) Oral two times a day PRN nausea    PAST MEDICAL:   Suicide attempt: 2018, used motrin  Depression    SURGICAL HISTORY:  No significant past surgical history    FAMILY HISTORY:    Social History:   Marijuana  Social EtOH  No smoking    Review of Systems:   CONSTITUTIONAL: No fever, weight loss, or fatigue  EYES: No eye pain, visual disturbances, or discharge  ENMT:  No difficulty hearing, tinnitus, vertigo; No sinus or throat pain  NECK: No pain or stiffness  RESPIRATORY: No cough, wheezing, chills or hemoptysis; No shortness of breath  CARDIOVASCULAR: No chest pain, palpitations, dizziness, or leg swelling  GASTROINTESTINAL: No abdominal or epigastric pain. No nausea, vomiting, or hematemesis; No diarrhea or constipation. No melena or hematochezia.  GENITOURINARY: No dysuria, frequency, hematuria, or incontinence  NEUROLOGICAL: No headaches, memory loss, loss of strength, numbness, or tremors  SKIN: No itching, burning, rashes, or lesions   LYMPH NODES: No enlarged glands  ENDOCRINE: No heat or cold intolerance; No hair loss  MUSCULOSKELETAL: No joint pain or swelling; No muscle, back, or extremity pain  HEME/LYMPH: No easy bruising, or bleeding gums  ALLERGY AND IMMUNOLOGIC: No hives or eczema    T(C): 36.3 (08-21-20 @ 08:06), Max: 37.1 (08-20-20 @ 14:37)  HR: 91 (08-20-20 @ 19:44) (77 - 91)  BP: 130/87 (08-20-20 @ 19:44) (107/75 - 130/87)  RR: 18 (08-20-20 @ 19:44) (18 - 18)  SpO2: 99% (08-20-20 @ 19:44) (98% - 99%)    PHYSICAL EXAM:  GENERAL: NAD, well-developed  HEAD:  Atraumatic, Normocephalic  EYES: EOMI, PERRLA, conjunctiva and sclera clear  NECK: Supple, No JVD  CHEST/LUNG: Clear to auscultation bilaterally; no wheeze  HEART: Regular rate and rhythm; no murmurs, rubs, or gallops  ABDOMEN: Soft, Nontender, Nondistended; Bowel sounds present  EXTREMITIES:  warm and well perfused, no clubbing, cyanosis, or edema  PSYCH: AAOx3  NEUROLOGY: non-focal  SKIN: No rashes or lesions    LABS:                        12.0   8.27  )-----------( 310      ( 20 Aug 2020 06:21 )             36.5     08-20    138  |  103  |  5<L>  ----------------------------<  108<H>  3.7   |  21<L>  |  0.73    Ca    9.7      20 Aug 2020 06:21    Microbiology Culture Results:   No growth to date. (08-18 @ 09:03)  Culture Results:   No growth to date. (08-18 @ 09:03)    CTH:   No CT evidence of acute intracranial hemorrhage, mass effect, hydrocephalus or acute territorial infarct.  Follow-up MRI can be obtained as clinically warranted.  Mildly low-lying cerebellar tonsils likely reflect benign cerebellar ectopia.    EKG 8/20: NSR at 70 bpm, QTc 429    Care Discussed with Providers:  psychiatry HPI: 20 yo F with history of depression and prior drug overdose (Motrin ) presented to [place] ED transferred to [facility] for naproxen overdose (~70 pills) s/p argument with parents now transferred to IP psych for continue management.   Patient reports since arrival she is feeling well.  Reports she hated her hospital experience and felt "ignored" and thus has no desire to overdose again.  Reports is not a big eater but has been snacking, tolerating diet.  No abdominal pain, no nausea/vomiting.  Voiding well, clear urine.  No BM yet but thinks due to not eating, reports at baseline BM 2-3 times per week.   Report used to be on OCP and stopped 2/2 unable to visit OP gyn during COVID, resumed last month, unclear name, states stopped now because thinks may have been trigger for worsening her depression, plans to f/u with her gyn.   No other complaints.     Allergies: No Known Allergies    MEDICATIONS  (STANDING):  buPROPion  milliGRAM(s) Oral daily    MEDICATIONS  (PRN):  hydrOXYzine hydrochloride 25 milliGRAM(s) Oral two times a day PRN anxiety  LORazepam  Tablet 2 milliGRAM(s) Oral every 6 hours PRN Agitation  LORazepam  Injectable 2 milliGRAM(s) IntraMuscular once PRN Severe agitation  melatonin 3 milliGRAM(s) Oral at bedtime PRN Insomnia  ondansetron    Tablet 4 milliGRAM(s) Oral two times a day PRN nausea    PAST MEDICAL:   Suicide attempt: 2018, used Motrin  Depression    SURGICAL HISTORY:  No significant past surgical history    FAMILY HISTORY:  States breast cancer in maternal grandmother,      Social History:   Marijuana daily in am "to help my appetite and if I wake up queasy"  Social EtOH but since not going out 2/2 COVID not drinking  In senior year at [employer] for [employer]--enjoys this very much  No smoking    Review of Systems:   CONSTITUTIONAL: No fever, weight loss, or fatigue  EYES: No eye pain, visual disturbances, or discharge  ENMT:  No difficulty hearing, tinnitus, vertigo; No sinus or throat pain  NECK: No pain or stiffness  RESPIRATORY: No cough, wheezing, chills or hemoptysis; No shortness of breath  CARDIOVASCULAR: No chest pain, palpitations, dizziness, or leg swelling  GASTROINTESTINAL: No abdominal or epigastric pain. No nausea, vomiting, or hematemesis; No diarrhea or constipation. No melena or hematochezia.  GENITOURINARY: No dysuria, frequency, hematuria, or incontinence  NEUROLOGICAL: No headaches, memory loss, loss of strength, numbness, or tremors  SKIN: No itching, burning, rashes, or lesions   LYMPH NODES: No enlarged glands  ENDOCRINE: No heat or cold intolerance; No hair loss  MUSCULOSKELETAL: No joint pain or swelling; No muscle, back, or extremity pain  HEME/LYMPH: No easy bruising, or bleeding gums  ALLERGY AND IMMUNOLOGIC: No hives or eczema    T(C): 36.3 (20 @ 08:06), Max: 37.1 (20 @ 14:37)  HR: 91 (20 @ 19:44) (77 - 91)  BP: 130/87 (20 @ 19:44) (107/75 - 130/87)  RR: 18 (20 @ 19:44) (18 - 18)  SpO2: 99% (20 @ 19:44) (98% - 99%)    PHYSICAL EXAM:  GENERAL: NAD, thin  HEAD:  Atraumatic, Normocephalic  EYES: EOMI, PERRLA, conjunctiva and sclera clear  NECK: Supple, No JVD  CHEST/LUNG: Clear to auscultation bilaterally; no wheeze  HEART: Regular rate and rhythm; no murmurs, rubs, or gallops  ABDOMEN: Soft, Nontender, Nondistended; Bowel sounds present  EXTREMITIES:  warm and well perfused, no clubbing, cyanosis, or edema  PSYCH: AAOx3  NEUROLOGY: non-focal  SKIN: + tattoos on b/l forearms, some bruising from prior IV/venipuncture on R forearm     LABS:                        12.0   8.27  )-----------( 310      ( 20 Aug 2020 06:21 )             36.5     08-    138  |  103  |  5<L>  ----------------------------<  108<H>  3.7   |  21<L>  |  0.73    Ca    9.7      20 Aug 2020 06:21    Microbiology Culture Results:   No growth to date. ( @ 09:03)  Culture Results:   No growth to date. (08-18 @ 09:03)    CTH:   No CT evidence of acute intracranial hemorrhage, mass effect, hydrocephalus or acute territorial infarct.  Follow-up MRI can be obtained as clinically warranted.  Mildly low-lying cerebellar tonsils likely reflect benign cerebellar ectopia.    EKG : NSR at 70 bpm, QTc 429    Care Discussed with Providers:  psychiatry

## 2020-08-21 NOTE — CONSULT NOTE ADULT - ASSESSMENT
20 yo F with history of depression and prior drug overdose (Motrin 2017) presented to [place] ED transferred to [facility] for naproxen overdose (~70 pills) s/p argument with parents now transferred to IP psych for continue management.     # Naprosyn OD, SA   - s/p monitoring at [facility] ICU, MS improved, AG closed  - labs unremarkable, normal renal fxn and LFTs    # Cerbellar Ectopia: unclear relevance noted incidentally on CTH  - neuro eval prn, per read  benign     # Depression  - plan per primary team 20 yo F with history of depression and prior drug overdose (Motrin 2017) presented to [place] ED transferred to [facility] for naproxen overdose (~70 pills) s/p argument with parents now transferred to IP psych for continue management.     # Naprosyn OD, SA   - s/p monitoring at [facility] ICU, MS improved, AG closed  - labs unremarkable, normal renal fxn and LFTs    # Constipation  - miralax prn    # Cerebellar Ectopia: unclear relevance noted incidentally on CTH  - neuro eval if deemed indication, per CTH read likely benign     # Depression  - plan per primary team

## 2020-08-22 LAB
-  CANDIDA ALBICANS: SIGNIFICANT CHANGE UP
-  CANDIDA GLABRATA: SIGNIFICANT CHANGE UP
-  CANDIDA KRUSEI: SIGNIFICANT CHANGE UP
-  CANDIDA PARAPSILOSIS: SIGNIFICANT CHANGE UP
-  CANDIDA TROPICALIS: SIGNIFICANT CHANGE UP
-  COAGULASE NEGATIVE STAPHYLOCOCCUS: SIGNIFICANT CHANGE UP
-  K. PNEUMONIAE GROUP: SIGNIFICANT CHANGE UP
-  KPC RESISTANCE GENE: SIGNIFICANT CHANGE UP
-  STREPTOCOCCUS SP. (NOT GRP A, B OR S PNEUMONIAE): SIGNIFICANT CHANGE UP
A BAUMANNII DNA SPEC QL NAA+PROBE: SIGNIFICANT CHANGE UP
E CLOAC COMP DNA BLD POS QL NAA+PROBE: SIGNIFICANT CHANGE UP
E COLI DNA BLD POS QL NAA+NON-PROBE: SIGNIFICANT CHANGE UP
ENTEROCOC DNA BLD POS QL NAA+NON-PROBE: SIGNIFICANT CHANGE UP
ENTEROCOC DNA BLD POS QL NAA+NON-PROBE: SIGNIFICANT CHANGE UP
GP B STREP DNA BLD POS QL NAA+NON-PROBE: SIGNIFICANT CHANGE UP
GRAM STN FLD: SIGNIFICANT CHANGE UP
HAEM INFLU DNA BLD POS QL NAA+NON-PROBE: SIGNIFICANT CHANGE UP
K OXYTOCA DNA BLD POS QL NAA+NON-PROBE: SIGNIFICANT CHANGE UP
L MONOCYTOG DNA BLD POS QL NAA+NON-PROBE: SIGNIFICANT CHANGE UP
METHOD TYPE: SIGNIFICANT CHANGE UP
MRSA SPEC QL CULT: SIGNIFICANT CHANGE UP
MSSA DNA SPEC QL NAA+PROBE: SIGNIFICANT CHANGE UP
N MEN ISLT CULT: SIGNIFICANT CHANGE UP
P AERUGINOSA DNA BLD POS NAA+NON-PROBE: SIGNIFICANT CHANGE UP
S MARCESCENS DNA BLD POS NAA+NON-PROBE: SIGNIFICANT CHANGE UP
S PNEUM DNA BLD POS QL NAA+NON-PROBE: SIGNIFICANT CHANGE UP
S PYO DNA BLD POS QL NAA+NON-PROBE: SIGNIFICANT CHANGE UP

## 2020-08-22 PROCEDURE — 99231 SBSQ HOSP IP/OBS SF/LOW 25: CPT | Mod: GC

## 2020-08-22 RX ADMIN — BUPROPION HYDROCHLORIDE 150 MILLIGRAM(S): 150 TABLET, EXTENDED RELEASE ORAL at 08:53

## 2020-08-22 RX ADMIN — Medication 3 MILLIGRAM(S): at 23:32

## 2020-08-22 RX ADMIN — Medication 25 MILLIGRAM(S): at 18:10

## 2020-08-23 LAB
CULTURE RESULTS: SIGNIFICANT CHANGE UP
SPECIMEN SOURCE: SIGNIFICANT CHANGE UP

## 2020-08-23 PROCEDURE — 99232 SBSQ HOSP IP/OBS MODERATE 35: CPT | Mod: GC

## 2020-08-23 RX ADMIN — Medication 25 MILLIGRAM(S): at 17:54

## 2020-08-23 RX ADMIN — BUPROPION HYDROCHLORIDE 150 MILLIGRAM(S): 150 TABLET, EXTENDED RELEASE ORAL at 09:27

## 2020-08-23 NOTE — CHART NOTE - NSCHARTNOTEFT_GEN_A_CORE
Notified by lab BC from 8/18 growing GP cocci in clusters.  Pt currently at [facility].  I notified on call Attending at [facility] and recommend repeat BC.    Maritza Oseguera DO  [facility]  [phone]

## 2020-08-24 PROCEDURE — 99232 SBSQ HOSP IP/OBS MODERATE 35: CPT

## 2020-08-24 RX ADMIN — BUPROPION HYDROCHLORIDE 150 MILLIGRAM(S): 150 TABLET, EXTENDED RELEASE ORAL at 09:29

## 2020-08-24 RX ADMIN — Medication 25 MILLIGRAM(S): at 17:57

## 2020-08-24 RX ADMIN — Medication 25 MILLIGRAM(S): at 09:29

## 2020-08-25 PROCEDURE — 99231 SBSQ HOSP IP/OBS SF/LOW 25: CPT

## 2020-08-25 PROCEDURE — 90853 GROUP PSYCHOTHERAPY: CPT

## 2020-08-25 PROCEDURE — 90832 PSYTX W PT 30 MINUTES: CPT

## 2020-08-25 RX ORDER — BUPROPION HYDROCHLORIDE 150 MG/1
1 TABLET, EXTENDED RELEASE ORAL
Qty: 14 | Refills: 0
Start: 2020-08-25 | End: 2020-09-07

## 2020-08-25 RX ADMIN — Medication 25 MILLIGRAM(S): at 18:20

## 2020-08-25 RX ADMIN — Medication 3 MILLIGRAM(S): at 23:00

## 2020-08-25 RX ADMIN — BUPROPION HYDROCHLORIDE 150 MILLIGRAM(S): 150 TABLET, EXTENDED RELEASE ORAL at 08:39

## 2020-08-26 VITALS — SYSTOLIC BLOOD PRESSURE: 122 MMHG | TEMPERATURE: 98 F | HEART RATE: 79 BPM | DIASTOLIC BLOOD PRESSURE: 80 MMHG

## 2020-08-26 LAB — HCG SERPL-ACNC: < 5 MIU/ML — SIGNIFICANT CHANGE UP

## 2020-08-26 PROCEDURE — 90853 GROUP PSYCHOTHERAPY: CPT

## 2020-08-26 PROCEDURE — 99238 HOSP IP/OBS DSCHRG MGMT 30/<: CPT

## 2020-08-26 RX ADMIN — BUPROPION HYDROCHLORIDE 150 MILLIGRAM(S): 150 TABLET, EXTENDED RELEASE ORAL at 09:44

## 2020-08-29 LAB
CULTURE RESULTS: SIGNIFICANT CHANGE UP
CULTURE RESULTS: SIGNIFICANT CHANGE UP
ORGANISM # SPEC MICROSCOPIC CNT: SIGNIFICANT CHANGE UP
ORGANISM # SPEC MICROSCOPIC CNT: SIGNIFICANT CHANGE UP
SPECIMEN SOURCE: SIGNIFICANT CHANGE UP
SPECIMEN SOURCE: SIGNIFICANT CHANGE UP

## 2020-08-31 ENCOUNTER — OUTPATIENT (OUTPATIENT)
Dept: OUTPATIENT SERVICES | Facility: HOSPITAL | Age: 20
LOS: 1 days | Discharge: HOME | End: 2020-08-31
Payer: COMMERCIAL

## 2020-11-05 DIAGNOSIS — F32.9 MAJOR DEPRESSIVE DISORDER, SINGLE EPISODE, UNSPECIFIED: ICD-10-CM

## 2021-01-19 PROCEDURE — 99214 OFFICE O/P EST MOD 30 MIN: CPT | Mod: 95

## 2021-02-16 PROCEDURE — 99214 OFFICE O/P EST MOD 30 MIN: CPT | Mod: 95

## 2021-03-16 PROCEDURE — 99214 OFFICE O/P EST MOD 30 MIN: CPT | Mod: 95

## 2021-03-18 NOTE — CONSULT NOTE ADULT - PROVIDER SPECIALTY LIST ADULT
The patient went to the hospital after her last visit.  Lab evaluation was normal.  She did receive a course of steroids on Monday and Tuesday.  Patient has been checking her blood pressures at home and they have been good.  Mostly in the 1 teens over 60s.  She has had some mild headaches and has not taken any Tylenol.  No visual changes or abdominal pain.    Blood pressure 140/90 with trace protein repeat is 128/84  Trace lower extremity edema.  DTRs are 1+ bilaterally  Weight loss of 3 pounds since last visit  BPP is 8 out of 8.  MICHELLE is 17.  Baby is breech today.    Assessment-35 weeks  Mild preeclampsia-continue twice-weekly testing here in the office.  Patient was given strict instructions to go to labor and delivery for elevated blood pressure, symptoms, decreased fetal movements or labor.  Plan for delivery at 37 weeks.  Breech-discussed possible  if baby remains breech.  We will continue to follow on ultrasound.  Recommend kick counts.   Hospitalist

## 2021-03-23 PROCEDURE — 99214 OFFICE O/P EST MOD 30 MIN: CPT | Mod: 95

## 2021-03-30 PROCEDURE — 99214 OFFICE O/P EST MOD 30 MIN: CPT | Mod: 95

## 2021-04-20 PROCEDURE — 99214 OFFICE O/P EST MOD 30 MIN: CPT | Mod: 95

## 2021-05-18 PROCEDURE — 99214 OFFICE O/P EST MOD 30 MIN: CPT | Mod: 95

## 2021-06-08 PROCEDURE — 99214 OFFICE O/P EST MOD 30 MIN: CPT | Mod: 95

## 2021-07-06 PROCEDURE — 99214 OFFICE O/P EST MOD 30 MIN: CPT | Mod: 95

## 2021-09-01 PROCEDURE — 99214 OFFICE O/P EST MOD 30 MIN: CPT | Mod: 95

## 2021-09-29 PROCEDURE — 99214 OFFICE O/P EST MOD 30 MIN: CPT | Mod: 95

## 2021-10-26 PROCEDURE — 99214 OFFICE O/P EST MOD 30 MIN: CPT | Mod: 95

## 2021-11-23 PROCEDURE — 99214 OFFICE O/P EST MOD 30 MIN: CPT | Mod: 95

## 2022-01-25 PROCEDURE — 99214 OFFICE O/P EST MOD 30 MIN: CPT | Mod: 95

## 2022-02-22 PROCEDURE — 99214 OFFICE O/P EST MOD 30 MIN: CPT | Mod: 95

## 2022-03-15 PROCEDURE — 99214 OFFICE O/P EST MOD 30 MIN: CPT | Mod: 95

## 2022-10-19 NOTE — ED ADULT NURSE NOTE - CAS EDN DISCHARGE ASSESSMENT
History  Chief Complaint   Patient presents with   • Abscess     Pt reports having out pt CT done a week ago, was placed on Abx to treat abscess of abd wall  Having worsening symptoms since, and was instructed to come to ED     Patient has an abscess in the lower portion of her abdomen right over the suprapubic region  Has a large area of redness and just today started draining spontaneously  Patient already on antibiotics and had a recent CT scan done for similar workup  Currently no fever no chills no systemic symptoms like nausea vomiting  Pain is moderate and constant  It has actually improved since it opened up and started draining  Patient has a history of abdominal fistula surgeries also pancreatic cyst with surgery colostomy with reversal due to complications of pancreatitis  Patient does not smoke or drink    Patient has underlying diabetes, hypertension      History provided by:  Patient   used: No        Prior to Admission Medications   Prescriptions Last Dose Informant Patient Reported? Taking?    Aspirin 81 MG EC tablet  Self Yes No   Sig: Take by mouth   Calcium-Vitamin D-Vitamin K 650-12 5-40 MG-MCG-MCG CHEW  Self Yes No   Cholecalciferol (VITAMIN D3 PO)  Self Yes No   Sig: Take by mouth   Cyanocobalamin (VITAMIN B 12) 100 MCG LOZG  Self Yes No   Sig: Take by mouth   FREESTYLE LITE test strip  Self Yes No   Sig: use 1 TEST STRIP to TEST BLOOD SUGAR four times a day   Insulin Disposable Pump (OmniPod 5 Pack) MISC  Self Yes No   Sig: CHANGE POD EVERY 3 DAYS   Insulin Infusion Pump KIT  Self Yes No   Sig: by Does not apply route    LOSARTAN POTASSIUM PO  Self Yes No   Sig: Take 50 mg by mouth   Nikki 128 5 % hypertonic ophthalmic solution   Yes No   Sig: INSTILL 1 DROP INTO BOTH EYES EVERY EVENING AS DIRECTED   NOVOLOG 100 UNIT/ML injection  Self Yes No   Omega-3 Fatty Acids (FISH OIL) 1,000 mg  Self Yes No   Sig: Take 1,000 mg by mouth   Polyethylene Glycol 3350 (MIRALAX PO) Self Yes No   Specialty Vitamins Products (MARTI-RX DIABETIC VITAMIN PO)  Self Yes No   Sig: Take by mouth daily   anastrozole (ARIMIDEX) 1 mg tablet   No No   Sig: take 1 tablet by mouth once daily   fexofenadine (ALLEGRA) 60 MG tablet  Self Yes No   Sig: Take by mouth   hydrocortisone 0 5 % cream   Yes No   Sig: Apply topically 2 (two) times a day   losartan (COZAAR) 50 mg tablet  Self Yes No   Sig: Take 50 mg by mouth daily   prednisoLONE acetate (PRED FORTE) 1 % ophthalmic suspension   Yes No   Sig: instill 1 drop into right eye four times a day      Facility-Administered Medications: None       Past Medical History:   Diagnosis Date   • BRCA gene mutation negative 01/2020    [facility]   • Breast cancer ([address] ) 12/2019    left   • Diabetes mellitus ([address] )    • History of radiation therapy 01/2020    left breast ca   • History of transfusion     2010   • Hypertension    • Pancreatitis     pancreatic cyst that burst   • Pulmonary embolism on left [facility])     and right side       Past Surgical History:   Procedure Laterality Date   • BREAST BIOPSY  2019   • BREAST CYST EXCISION Left 2009    benign   • BREAST LUMPECTOMY Left 02/28/2020    Procedure: BREAST NEEDLE LOCALIZED LUMPECTOMY (NEEDLE LOC AT 0900); Surgeon: Aleksandra Mulligan MD;  Location: [facility];  Service: Surgical Oncology   • COLONOSCOPY     • EYE SURGERY Bilateral     Cataract   • FISTULA REPAIR      small intestine   • ILEOSTOMY      with reversal   • LYMPH NODE BIOPSY Left 02/28/2020    Procedure: SENTINEL LYMPH NODE BIOPSY; LYMPHATIC MAPPING WITH BLUE DYE AND RADIOACTIVE DYE (INJECT AT 1100 BY DR BENOIT IN THE OR);   Surgeon: Aleksandra Mulligan MD;  Location: [facility];  Service: Surgical Oncology   • MAMMO NEEDLE LOCALIZATION LEFT (ALL INC) Left 02/28/2020   • TRACHEOSTOMY  2010    with repair   • US GUIDED BREAST BIOPSY LEFT COMPLETE Left 12/23/2019       Family History   Problem Relation Age of Onset   • Breast cancer Mother 43   • Cervical cancer Mother 58   • Breast cancer Sister 61   • No Known Problems Maternal Grandmother    • No Known Problems Paternal Grandmother    • No Known Problems Maternal Aunt    • Bone cancer Paternal Aunt 43   • No Known Problems Paternal Aunt    • Cancer Father         bladder   • Breast cancer Other      I have reviewed and agree with the history as documented  E-Cigarette/Vaping   • E-Cigarette Use Never User      E-Cigarette/Vaping Substances   • Nicotine No    • THC No    • CBD No    • Flavoring No    • Other No    • Unknown No      Social History     Tobacco Use   • Smoking status: Never Smoker   • Smokeless tobacco: Never Used   Vaping Use   • Vaping Use: Never used   Substance Use Topics   • Alcohol use: No   • Drug use: No       Review of Systems   Constitutional: Negative for chills and fever  HENT: Negative for ear pain and sore throat  Eyes: Negative for pain and visual disturbance  Respiratory: Negative for cough and shortness of breath  Cardiovascular: Negative for chest pain and palpitations  Gastrointestinal: Positive for abdominal pain  Negative for vomiting  Genitourinary: Negative for dysuria and hematuria  Musculoskeletal: Negative for arthralgias and back pain  Skin: Negative for color change and rash  Neurological: Negative for seizures and syncope  All other systems reviewed and are negative  Physical Exam  Physical Exam  Vitals (hypertensive ) and nursing note reviewed  Constitutional:       General: She is not in acute distress  Appearance: She is well-developed and normal weight  HENT:      Head: Normocephalic and atraumatic  Right Ear: External ear normal       Left Ear: External ear normal    Eyes:      Extraocular Movements: Extraocular movements intact  Conjunctiva/sclera: Conjunctivae normal       Pupils: Pupils are equal, round, and reactive to light  Cardiovascular:      Rate and Rhythm: Normal rate and regular rhythm  Pulses: Normal pulses        Heart sounds: Normal heart sounds  No murmur heard  Pulmonary:      Effort: Pulmonary effort is normal  No respiratory distress  Breath sounds: Normal breath sounds  Abdominal:      General: Abdomen is flat  Palpations: Abdomen is soft  Tenderness: There is no abdominal tenderness  Musculoskeletal:      Cervical back: Neck supple  Skin:     General: Skin is warm and dry  Capillary Refill: Capillary refill takes less than 2 seconds  Comments: Patient has a fairly large area of cellulitis and just below the umbilicus has an open area that has been draining  I was able to put pressure and expel abundant amount of purulent fluid from the abscess  The abscess itself was irrigated and cleaned up  Wound cultures were taken and sent  Neurological:      General: No focal deficit present  Mental Status: She is alert and oriented to person, place, and time     Psychiatric:         Mood and Affect: Mood normal          Behavior: Behavior normal          Vital Signs  ED Triage Vitals [10/19/22 1041]   Temperature Pulse Respirations Blood Pressure SpO2   98 1 °F (36 7 °C) 103 18 (!) 227/106 98 %      Temp Source Heart Rate Source Patient Position - Orthostatic VS BP Location FiO2 (%)   Temporal Monitor Sitting Right arm --      Pain Score       3           Vitals:    10/19/22 1041   BP: (!) 227/106   Pulse: 103   Patient Position - Orthostatic VS: Sitting         Visual Acuity      ED Medications  Medications   clindamycin (CLEOCIN) IVPB (premix in dextrose) 900 mg 50 mL (has no administration in time range)       Diagnostic Studies  Results Reviewed     Procedure Component Value Units Date/Time    CBC and differential [[id number]]     Lab Status: No result Specimen: Blood     Comprehensive metabolic panel [[id number]]     Lab Status: No result Specimen: Blood     Lactic acid, plasma [[id number]]     Lab Status: No result Specimen: Blood     Wound culture and Gram stain [[id number]]     Lab Status: No result Specimen: Wound from Abdominal                  No orders to display              Procedures  Procedures         ED Course                                             MDM  Number of Diagnoses or Management Options     Amount and/or Complexity of Data Reviewed  Clinical lab tests: ordered and reviewed    Risk of Complications, Morbidity, and/or Mortality  Presenting problems: moderate  Diagnostic procedures: moderate  Management options: moderate        Disposition  Final diagnoses:   None     ED Disposition     None      Follow-up Information    None         Patient's Medications   Discharge Prescriptions    No medications on file       No discharge procedures on file      PDMP Review     None          ED Provider  Electronically Signed by Chloride 106 mmol/L      CO2 30 mmol/L      ANION GAP 8 mmol/L      BUN 15 mg/dL      Creatinine 0 69 mg/dL      Glucose 185 mg/dL      Calcium 10 1 mg/dL      Corrected Calcium 10 9 mg/dL      AST 12 U/L      ALT 23 U/L      Alkaline Phosphatase 98 U/L      Total Protein 7 4 g/dL      Albumin 3 0 g/dL      Total Bilirubin 0 24 mg/dL      eGFR 89 ml/min/1 73sq m     Narrative:      [place] guidelines for Chronic Kidney Disease (CKD):   •  Stage 1 with normal or high GFR (GFR > 90 mL/min/1 73 square meters)  •  Stage 2 Mild CKD (GFR = 60-89 mL/min/1 73 square meters)  •  Stage 3A Moderate CKD (GFR = 45-59 mL/min/1 73 square meters)  •  Stage 3B Moderate CKD (GFR = 30-44 mL/min/1 73 square meters)  •  Stage 4 Severe CKD (GFR = 15-29 mL/min/1 73 square meters)  •  Stage 5 End Stage CKD (GFR <15 mL/min/1 73 square meters)  Note: GFR calculation is accurate only with a steady state creatinine    Lactic acid, plasma [[id number]]  (Normal) Collected: 10/19/22 1116    Lab Status: Final result Specimen: Blood from Arm, Right Updated: 10/19/22 1151     LACTIC ACID 1 1 mmol/L     Narrative:      Result may be elevated if tourniquet was used during collection      CBC and differential [[id number]]  (Abnormal) Collected: 10/19/22 1116    Lab Status: Final result Specimen: Blood from Arm, Right Updated: 10/19/22 1133     WBC 4 34 Thousand/uL      RBC 4 10 Million/uL      Hemoglobin 11 2 g/dL      Hematocrit 35 9 %      MCV 88 fL      MCH 27 3 pg      MCHC 31 2 g/dL      RDW 13 3 %      MPV 9 2 fL      Platelets 953 Thousands/uL      nRBC 0 /100 WBCs      Neutrophils Relative 62 %      Immat GRANS % 1 %      Lymphocytes Relative 25 %      Monocytes Relative 10 %      Eosinophils Relative 1 %      Basophils Relative 1 %      Neutrophils Absolute 2 74 Thousands/µL      Immature Grans Absolute 0 02 Thousand/uL      Lymphocytes Absolute 1 08 Thousands/µL      Monocytes Absolute 0 43 Thousand/µL Eosinophils Absolute 0 05 Thousand/µL      Basophils Absolute 0 02 Thousands/µL                  No orders to display              Procedures  Procedures         ED Course  ED Course as of 10/25/22 2206   Wed Oct 19, 2022   1205 WBC: 4 34   1205 Hemoglobin(!): 11 2                                             MDM  Number of Diagnoses or Management Options     Amount and/or Complexity of Data Reviewed  Clinical lab tests: ordered and reviewed    Risk of Complications, Morbidity, and/or Mortality  Presenting problems: moderate  Diagnostic procedures: moderate  Management options: moderate        Disposition  Final diagnoses:   Abscess     Time reflects when diagnosis was documented in both MDM as applicable and the Disposition within this note     Time User Action Codes Description Comment    10/19/2022 12:12 PM Mitzi Bill Add [L02 [id number]] Abscess       ED Disposition     ED Disposition   Discharge    Condition   Stable    Date/Time   Wed Oct 19, 2022 12:13 PM    Comment   Preston Velez discharge to home/self care                 Follow-up Information     Follow up With Specialties Details Why Contact Info    SELIN Wells Nurse Practitioner, Family Medicine In 1 week If symptoms worsen [address] [id number] [facility]  [phone]            Discharge Medication List as of 10/19/2022 12:13 PM      START taking these medications    Details   clindamycin (CLEOCIN) 300 MG capsule Take 1 capsule (300 mg total) by mouth 4 (four) times a day for 7 days, Starting Wed 10/19/2022, Until Wed 10/26/2022, Normal         CONTINUE these medications which have NOT CHANGED    Details   anastrozole (ARIMIDEX) 1 mg tablet take 1 tablet by mouth once daily, Normal      Aspirin 81 MG EC tablet Take by mouth, Historical Med      Calcium-Vitamin D-Vitamin K 650-12 5-40 MG-MCG-MCG CHEW Starting Fri 5/1/2020, Historical Med      Cholecalciferol (VITAMIN D3 PO) Take by mouth, Historical Med      Cyanocobalamin (VITAMIN B 12) 100 MCG LOZG Take by mouth, Historical Med      fexofenadine (ALLEGRA) 60 MG tablet Take by mouth, Historical Med      FREESTYLE LITE test strip use 1 TEST STRIP to TEST BLOOD SUGAR four times a day, Historical Med      hydrocortisone 0 5 % cream Apply topically 2 (two) times a day, Historical Med      Insulin Disposable Pump (OmniPod 5 Pack) MISC CHANGE POD EVERY 3 DAYS, Historical Med      Insulin Infusion Pump KIT by Does not apply route , Historical Med      !! losartan (COZAAR) 50 mg tablet Take 50 mg by mouth daily, Starting Mon 9/20/2021, Historical Med      !! LOSARTAN POTASSIUM PO Take 50 mg by mouth, Historical Med      Nikki 128 5 % hypertonic ophthalmic solution INSTILL 1 DROP INTO BOTH EYES EVERY EVENING AS DIRECTED, Historical Med      NOVOLOG 100 UNIT/ML injection Starting Mon 4/2/2018, Historical Med      Omega-3 Fatty Acids (FISH OIL) 1,000 mg Take 1,000 mg by mouth, Historical Med      Polyethylene Glycol 3350 (MIRALAX PO) Starting Tue 9/29/2020, Historical Med      prednisoLONE acetate (PRED FORTE) 1 % ophthalmic suspension instill 1 drop into right eye four times a day, Historical Med      Specialty Vitamins Products (MARTI-RX DIABETIC VITAMIN PO) Take by mouth daily, Historical Med       !! - Potential duplicate medications found  Please discuss with provider  No discharge procedures on file      PDMP Review     None          ED Provider  Electronically Signed by           Radha Reis MD  10/25/22 0898 Alert and oriented to person, place and time

## 2025-06-19 NOTE — ED PROVIDER NOTE - EMPLOYMENT
Hpi Title: Evaluation of Skin Lesions
Have Your Spot(S) Been Treated In The Past?: has not been treated
N/A